# Patient Record
Sex: FEMALE | Race: WHITE | NOT HISPANIC OR LATINO | Employment: PART TIME | URBAN - NONMETROPOLITAN AREA
[De-identification: names, ages, dates, MRNs, and addresses within clinical notes are randomized per-mention and may not be internally consistent; named-entity substitution may affect disease eponyms.]

---
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
## 2019-03-01 ENCOUNTER — OFFICE VISIT (OUTPATIENT)
Dept: OBSTETRICS AND GYNECOLOGY | Facility: CLINIC | Age: 19
End: 2019-03-01

## 2019-03-01 VITALS
HEIGHT: 61 IN | BODY MASS INDEX: 20.01 KG/M2 | DIASTOLIC BLOOD PRESSURE: 60 MMHG | WEIGHT: 106 LBS | SYSTOLIC BLOOD PRESSURE: 90 MMHG

## 2019-03-01 DIAGNOSIS — Z71.85 HPV VACCINE COUNSELING: ICD-10-CM

## 2019-03-01 DIAGNOSIS — Z00.00 WELL WOMAN EXAM WITHOUT GYNECOLOGICAL EXAM: Primary | ICD-10-CM

## 2019-03-01 DIAGNOSIS — N92.0 MENORRHAGIA WITH REGULAR CYCLE: ICD-10-CM

## 2019-03-01 PROCEDURE — 99395 PREV VISIT EST AGE 18-39: CPT | Performed by: NURSE PRACTITIONER

## 2019-03-01 RX ORDER — NORETHINDRONE ACETATE AND ETHINYL ESTRADIOL, AND FERROUS FUMARATE 1MG-20(24)
1 KIT ORAL DAILY
Qty: 84 CAPSULE | Refills: 3 | Status: SHIPPED | OUTPATIENT
Start: 2019-03-01 | End: 2020-05-29 | Stop reason: SDUPTHER

## 2019-03-01 NOTE — PROGRESS NOTES
Subjective   Brittney Stevens is a 18 y.o. female  YOB: 2000        Chief Complaint   Patient presents with   • Contraception     Pt is here for refill on birth control pills.       Contraception   The current episode started more than 1 year ago. Pertinent negatives include no abdominal pain, arthralgias, chest pain, congestion, fatigue, fever, joint swelling, myalgias, nausea, numbness, rash, sore throat or vomiting.       The following portions of the patient's history were reviewed and updated as appropriate: allergies, current medications, past family history, past medical history, past social history, past surgical history and problem list.    No Known Allergies    History reviewed. No pertinent past medical history.    Family History   Problem Relation Age of Onset   • Lung cancer Maternal Grandfather    • Breast cancer Neg Hx    • Ovarian cancer Neg Hx    • Colon cancer Neg Hx        Social History     Socioeconomic History   • Marital status: Single     Spouse name: Not on file   • Number of children: Not on file   • Years of education: Not on file   • Highest education level: Not on file   Social Needs   • Financial resource strain: Not on file   • Food insecurity - worry: Not on file   • Food insecurity - inability: Not on file   • Transportation needs - medical: Not on file   • Transportation needs - non-medical: Not on file   Occupational History   • Not on file   Tobacco Use   • Smoking status: Never Smoker   • Smokeless tobacco: Never Used   Substance and Sexual Activity   • Alcohol use: No   • Drug use: No   • Sexual activity: Not on file   Other Topics Concern   • Not on file   Social History Narrative   • Not on file         Current Outpatient Medications:   •  Norethin Ace-Eth Estrad-FE (TAYTULLA) 1-20 MG-MCG(24) capsule, Take 1 tablet by mouth Daily., Disp: 84 capsule, Rfl: 3    Patient's last menstrual period was 02/21/2019 (exact date).    Sexual History:         Could not be  calculated    History reviewed. No pertinent surgical history.    Review of Systems   Constitutional: Negative for activity change, appetite change, fatigue, fever, unexpected weight gain and unexpected weight loss.   HENT: Negative for congestion, ear pain, hearing loss, nosebleeds, rhinorrhea, sore throat, tinnitus and trouble swallowing.    Eyes: Negative for blurred vision, pain, discharge, itching and visual disturbance.   Respiratory: Negative for apnea, chest tightness, shortness of breath and wheezing.    Cardiovascular: Negative for chest pain and leg swelling.   Gastrointestinal: Negative for abdominal pain, blood in stool, constipation, diarrhea, nausea, vomiting and GERD.   Endocrine: Negative for heat intolerance, polydipsia and polyuria.   Genitourinary: Negative for urinary incontinence, decreased libido, difficulty urinating, dyspareunia, dysuria, frequency, genital sores, hematuria, menstrual problem, pelvic pain, urgency, vaginal pain and breast lump.   Musculoskeletal: Negative for arthralgias, back pain, joint swelling and myalgias.   Skin: Negative for color change, rash and skin lesions.   Allergic/Immunologic: Negative for environmental allergies, food allergies and immunocompromised state.   Neurological: Negative for dizziness, tremors, seizures, syncope, facial asymmetry, numbness and headache.   Hematological: Negative for adenopathy. Does not bruise/bleed easily.   Psychiatric/Behavioral: Negative for agitation, hallucinations, sleep disturbance, suicidal ideas and depressed mood. The patient is not nervous/anxious.        Objective   Physical Exam   Constitutional: She is oriented to person, place, and time. She appears well-developed and well-nourished.   HENT:   Head: Normocephalic.   Eyes: Pupils are equal, round, and reactive to light.   Neck: Normal range of motion.   Cardiovascular: Normal rate and regular rhythm.   Pulmonary/Chest: Effort normal and breath sounds normal.  "  Abdominal: Soft.   Musculoskeletal: Normal range of motion.   Neurological: She is alert and oriented to person, place, and time.   Skin: Skin is warm and dry.   Psychiatric: She has a normal mood and affect. Her behavior is normal.   Nursing note and vitals reviewed.        Vitals:    03/01/19 0954   BP: 90/60   Weight: 48.1 kg (106 lb)   Height: 154.9 cm (61\")       Brittney was seen today for contraception.    Diagnoses and all orders for this visit:    Well woman exam without gynecological exam  Comments:  Patient reports frequent spotting/bleeding throughout the month while on Lo Loestrin FE.  Discussed options and risks/benefits.  Samples and RX of Taytulla give    Breakthrough bleeding  Comments:  Patient reports frequent spotting/bleeding throughout the month while on Lo Loestrin FE.  Discussed options and risks/benefits.  Samples and RX of Taytulla give  Orders:  -     Norethin Ace-Eth Estrad-FE (TAYTULLA) 1-20 MG-MCG(24) capsule; Take 1 tablet by mouth Daily.    Menorrhagia with regular cycle  -     Norethin Ace-Eth Estrad-FE (TAYTULLA) 1-20 MG-MCG(24) capsule; Take 1 tablet by mouth Daily.    HPV vaccine counseling          Patient's Body mass index is 20.03 kg/m². BMI is below normal parameters. Recommendations include: none (medical contraindication).             Non-Smoker    MyChart Instructions Given       "

## 2020-05-29 ENCOUNTER — TELEPHONE (OUTPATIENT)
Dept: OBSTETRICS AND GYNECOLOGY | Facility: CLINIC | Age: 20
End: 2020-05-29

## 2020-05-29 DIAGNOSIS — N92.0 MENORRHAGIA WITH REGULAR CYCLE: ICD-10-CM

## 2020-05-29 RX ORDER — NORETHINDRONE ACETATE AND ETHINYL ESTRADIOL, AND FERROUS FUMARATE 1MG-20(24)
1 KIT ORAL DAILY
Qty: 28 CAPSULE | Refills: 0 | Status: SHIPPED | OUTPATIENT
Start: 2020-05-29 | End: 2020-06-09 | Stop reason: SDUPTHER

## 2020-05-29 NOTE — TELEPHONE ENCOUNTER
Pt calls - she has scheduled her appt on 6/9/2020.  Requesting one refill on OCP to get to her appt.   Med order sent.

## 2020-06-09 ENCOUNTER — OFFICE VISIT (OUTPATIENT)
Dept: OBSTETRICS AND GYNECOLOGY | Facility: CLINIC | Age: 20
End: 2020-06-09

## 2020-06-09 VITALS
DIASTOLIC BLOOD PRESSURE: 68 MMHG | SYSTOLIC BLOOD PRESSURE: 118 MMHG | HEIGHT: 61 IN | BODY MASS INDEX: 21.52 KG/M2 | WEIGHT: 114 LBS

## 2020-06-09 DIAGNOSIS — Z00.00 WELL WOMAN EXAM WITHOUT GYNECOLOGICAL EXAM: Primary | ICD-10-CM

## 2020-06-09 DIAGNOSIS — Z71.85 HPV VACCINE COUNSELING: ICD-10-CM

## 2020-06-09 DIAGNOSIS — N92.0 MENORRHAGIA WITH REGULAR CYCLE: ICD-10-CM

## 2020-06-09 PROCEDURE — 90651 9VHPV VACCINE 2/3 DOSE IM: CPT | Performed by: NURSE PRACTITIONER

## 2020-06-09 PROCEDURE — 99395 PREV VISIT EST AGE 18-39: CPT | Performed by: NURSE PRACTITIONER

## 2020-06-09 PROCEDURE — 90471 IMMUNIZATION ADMIN: CPT | Performed by: NURSE PRACTITIONER

## 2020-06-09 RX ORDER — NORETHINDRONE ACETATE AND ETHINYL ESTRADIOL, AND FERROUS FUMARATE 1MG-20(24)
1 KIT ORAL DAILY
Qty: 84 CAPSULE | Refills: 3 | Status: SHIPPED | OUTPATIENT
Start: 2020-06-09 | End: 2021-04-22 | Stop reason: SDUPTHER

## 2020-06-09 NOTE — PROGRESS NOTES
Subjective   Brittney Stevens is a 19 y.o. female  YOB: 2000        Chief Complaint   Patient presents with   • Annual Exam     Patient here for yearly exam. Last yearly was on 03/01/19. Patient is on Taytulla OCP and doing well with it. Patient needs refills sent to her pharmacy. Patient voices no complaints or concerns today.        Gynecologic Exam   The patient's pertinent negatives include no pelvic pain. Pertinent negatives include no abdominal pain, back pain, constipation, diarrhea, dysuria, fever, frequency, hematuria, nausea, rash, sore throat, urgency or vomiting.       The following portions of the patient's history were reviewed and updated as appropriate: allergies, current medications, past family history, past medical history, past social history, past surgical history and problem list.    No Known Allergies    History reviewed. No pertinent past medical history.    Family History   Problem Relation Age of Onset   • Lung cancer Maternal Grandfather    • Breast cancer Neg Hx    • Ovarian cancer Neg Hx    • Colon cancer Neg Hx        Social History     Socioeconomic History   • Marital status: Single     Spouse name: Not on file   • Number of children: Not on file   • Years of education: Not on file   • Highest education level: Not on file   Tobacco Use   • Smoking status: Never Smoker   • Smokeless tobacco: Never Used   Substance and Sexual Activity   • Alcohol use: No   • Drug use: No         Current Outpatient Medications:   •  Norethin Ace-Eth Estrad-FE (Taytulla) 1-20 MG-MCG(24) capsule, Take 1 tablet by mouth Daily., Disp: 84 capsule, Rfl: 3    No LMP recorded.    Sexual History:         Could not be calculated    No past surgical history on file.    Review of Systems   Constitutional: Negative for activity change, appetite change, fatigue, fever, unexpected weight gain and unexpected weight loss.   HENT: Negative for congestion, ear pain, hearing loss, nosebleeds, rhinorrhea,  sore throat, tinnitus and trouble swallowing.    Eyes: Negative for blurred vision, pain, discharge, itching and visual disturbance.   Respiratory: Negative for apnea, chest tightness, shortness of breath and wheezing.    Cardiovascular: Negative for chest pain and leg swelling.   Gastrointestinal: Negative for abdominal pain, blood in stool, constipation, diarrhea, nausea, vomiting and GERD.   Endocrine: Negative for heat intolerance, polydipsia and polyuria.   Genitourinary: Negative for breast lump, decreased libido, difficulty urinating, dyspareunia, dysuria, frequency, genital sores, hematuria, menstrual problem, pelvic pain, urgency, urinary incontinence and vaginal pain.   Musculoskeletal: Negative for arthralgias, back pain, joint swelling and myalgias.   Skin: Negative for color change, rash and skin lesions.   Allergic/Immunologic: Negative for environmental allergies, food allergies and immunocompromised state.   Neurological: Negative for dizziness, tremors, seizures, syncope, facial asymmetry, numbness and headache.   Hematological: Negative for adenopathy. Does not bruise/bleed easily.   Psychiatric/Behavioral: Negative for agitation, hallucinations, sleep disturbance, suicidal ideas and depressed mood. The patient is not nervous/anxious.        Objective   Physical Exam   Constitutional: She is oriented to person, place, and time. She appears well-developed and well-nourished.   HENT:   Head: Normocephalic.   Eyes: Pupils are equal, round, and reactive to light.   Neck: Normal range of motion.   Cardiovascular: Normal rate and regular rhythm.   Pulmonary/Chest: Effort normal and breath sounds normal.   Abdominal: Soft.   Musculoskeletal: Normal range of motion.   Neurological: She is alert and oriented to person, place, and time.   Skin: Skin is warm and dry.   Psychiatric: She has a normal mood and affect. Her behavior is normal.   Nursing note and vitals reviewed.        Vitals:    06/09/20 1412  "  BP: 118/68   Weight: 51.7 kg (114 lb)   Height: 154.9 cm (61\")       Brittney was seen today for annual exam.    Diagnoses and all orders for this visit:    Well woman exam without gynecological exam  Comments:  Normal well woman exam.    Menorrhagia with regular cycle  Comments:  Doing well on Taytulla that she started related to menorrhagia and wants to remain on it.  Refill sent to pharmacy.  Orders:  -     Norethin Ace-Eth Estrad-FE (Taytulla) 1-20 MG-MCG(24) capsule; Take 1 tablet by mouth Daily.    HPV vaccine counseling  Comments:  Discussed HPV and Gardasil at length with patient patient wants to start Gardasil vaccine series.  First Gardasil injection given today.  Will RTO in 2 months for second          Patient's Body mass index is 21.54 kg/m². BMI is within normal parameters. No follow-up required..             Non-Smoker    MyChart Instructions Given       "

## 2020-08-12 ENCOUNTER — CLINICAL SUPPORT (OUTPATIENT)
Dept: OBSTETRICS AND GYNECOLOGY | Facility: CLINIC | Age: 20
End: 2020-08-12

## 2020-08-12 DIAGNOSIS — Z23 NEED FOR HPV VACCINATION: Primary | ICD-10-CM

## 2020-08-12 PROCEDURE — 90651 9VHPV VACCINE 2/3 DOSE IM: CPT | Performed by: NURSE PRACTITIONER

## 2020-08-12 PROCEDURE — 90471 IMMUNIZATION ADMIN: CPT | Performed by: NURSE PRACTITIONER

## 2020-12-14 ENCOUNTER — TELEPHONE (OUTPATIENT)
Dept: OBSTETRICS AND GYNECOLOGY | Facility: CLINIC | Age: 20
End: 2020-12-14

## 2020-12-14 ENCOUNTER — CLINICAL SUPPORT (OUTPATIENT)
Dept: OBSTETRICS AND GYNECOLOGY | Facility: CLINIC | Age: 20
End: 2020-12-14

## 2020-12-14 DIAGNOSIS — Z23 NEED FOR HPV VACCINATION: Primary | ICD-10-CM

## 2020-12-14 PROCEDURE — 90651 9VHPV VACCINE 2/3 DOSE IM: CPT | Performed by: NURSE PRACTITIONER

## 2020-12-14 PROCEDURE — 96372 THER/PROPH/DIAG INJ SC/IM: CPT | Performed by: NURSE PRACTITIONER

## 2021-04-22 ENCOUNTER — TELEPHONE (OUTPATIENT)
Dept: OBSTETRICS AND GYNECOLOGY | Facility: CLINIC | Age: 21
End: 2021-04-22

## 2021-04-22 DIAGNOSIS — N92.0 MENORRHAGIA WITH REGULAR CYCLE: Primary | ICD-10-CM

## 2021-04-22 RX ORDER — NORETHINDRONE ACETATE AND ETHINYL ESTRADIOL, AND FERROUS FUMARATE 1MG-20(24)
1 KIT ORAL DAILY
Qty: 28 CAPSULE | Refills: 0 | Status: SHIPPED | OUTPATIENT
Start: 2021-04-22 | End: 2021-07-06

## 2021-04-22 NOTE — TELEPHONE ENCOUNTER
Annual has been scheduled. Patient is requesting refill on Taytulla to be sent to [facility] in [place]

## 2021-07-04 DIAGNOSIS — N92.0 MENORRHAGIA WITH REGULAR CYCLE: ICD-10-CM

## 2021-07-06 RX ORDER — NORETHINDRONE ACETATE AND ETHINYL ESTRADIOL, AND FERROUS FUMARATE 1MG-20(24)
KIT ORAL
Qty: 28 CAPSULE | Refills: 0 | Status: SHIPPED | OUTPATIENT
Start: 2021-07-06 | End: 2021-08-06

## 2021-08-06 DIAGNOSIS — N92.0 MENORRHAGIA WITH REGULAR CYCLE: ICD-10-CM

## 2021-08-06 RX ORDER — NORETHINDRONE ACETATE AND ETHINYL ESTRADIOL, AND FERROUS FUMARATE 1MG-20(24)
KIT ORAL
Qty: 28 CAPSULE | Refills: 0 | Status: SHIPPED | OUTPATIENT
Start: 2021-08-06 | End: 2021-09-08

## 2021-08-26 DIAGNOSIS — N92.0 MENORRHAGIA WITH REGULAR CYCLE: ICD-10-CM

## 2021-08-30 ENCOUNTER — TELEPHONE (OUTPATIENT)
Dept: OBSTETRICS AND GYNECOLOGY | Facility: CLINIC | Age: 21
End: 2021-08-30

## 2021-08-30 DIAGNOSIS — N92.0 MENORRHAGIA WITH REGULAR CYCLE: ICD-10-CM

## 2021-08-30 RX ORDER — NORETHINDRONE ACETATE AND ETHINYL ESTRADIOL, AND FERROUS FUMARATE 1MG-20(24)
KIT ORAL
Qty: 28 CAPSULE | Refills: 0 | OUTPATIENT
Start: 2021-08-30

## 2021-08-30 RX ORDER — NORETHINDRONE ACETATE AND ETHINYL ESTRADIOL, AND FERROUS FUMARATE 1MG-20(24)
1 KIT ORAL DAILY
Qty: 28 CAPSULE | Refills: 5 | Status: CANCELLED | OUTPATIENT
Start: 2021-08-30

## 2021-09-02 DIAGNOSIS — N92.0 MENORRHAGIA WITH REGULAR CYCLE: ICD-10-CM

## 2021-09-02 RX ORDER — NORETHINDRONE ACETATE AND ETHINYL ESTRADIOL, AND FERROUS FUMARATE 1MG-20(24)
KIT ORAL
Qty: 28 CAPSULE | Refills: 0 | OUTPATIENT
Start: 2021-09-02

## 2021-09-05 DIAGNOSIS — N92.0 MENORRHAGIA WITH REGULAR CYCLE: ICD-10-CM

## 2021-09-08 RX ORDER — NORETHINDRONE ACETATE AND ETHINYL ESTRADIOL, AND FERROUS FUMARATE 1MG-20(24)
KIT ORAL
Qty: 28 CAPSULE | Refills: 2 | Status: SHIPPED | OUTPATIENT
Start: 2021-09-08

## 2022-06-25 ENCOUNTER — HOSPITAL ENCOUNTER (EMERGENCY)
Facility: HOSPITAL | Age: 22
Discharge: HOME | End: 2022-06-25
Attending: EMERGENCY MEDICINE | Admitting: EMERGENCY MEDICINE

## 2022-06-25 ENCOUNTER — APPOINTMENT (OUTPATIENT)
Dept: CT IMAGING | Facility: HOSPITAL | Age: 22
End: 2022-06-25

## 2022-06-25 VITALS
WEIGHT: 122 LBS | RESPIRATION RATE: 18 BRPM | HEART RATE: 94 BPM | BODY MASS INDEX: 21.62 KG/M2 | SYSTOLIC BLOOD PRESSURE: 102 MMHG | DIASTOLIC BLOOD PRESSURE: 69 MMHG | OXYGEN SATURATION: 99 % | TEMPERATURE: 98.9 F | HEIGHT: 63 IN

## 2022-06-25 DIAGNOSIS — S00.93XA CONTUSION OF HEAD, UNSPECIFIED PART OF HEAD, INITIAL ENCOUNTER: Primary | ICD-10-CM

## 2022-06-25 DIAGNOSIS — J02.9 PHARYNGITIS, UNSPECIFIED ETIOLOGY: ICD-10-CM

## 2022-06-25 LAB — S PYO AG THROAT QL: NEGATIVE

## 2022-06-25 PROCEDURE — 87081 CULTURE SCREEN ONLY: CPT | Performed by: EMERGENCY MEDICINE

## 2022-06-25 PROCEDURE — 99283 EMERGENCY DEPT VISIT LOW MDM: CPT

## 2022-06-25 PROCEDURE — 87880 STREP A ASSAY W/OPTIC: CPT | Performed by: EMERGENCY MEDICINE

## 2022-06-25 PROCEDURE — 70450 CT HEAD/BRAIN W/O DYE: CPT

## 2022-06-25 RX ORDER — PREDNISONE 20 MG/1
10 TABLET ORAL 2 TIMES DAILY
Qty: 10 TABLET | Refills: 0 | Status: SHIPPED | OUTPATIENT
Start: 2022-06-25 | End: 2022-07-03 | Stop reason: HOSPADM

## 2022-06-25 NOTE — ED PROVIDER NOTES
"Subjective   Patient's initial complaint mostly of sore throat.  She has been going on for the past 4 to 5 days.  She denies any fever or chills but does say she has had some nasal congestion but no cough.  She does complain of a headache but she says that started on the day before the sore throat because a \"tree fell on the he had\".  She says that the top of her head.  She has had headaches since then that radiates all over throughout her head.      History provided by:  Patient   used: No    Sore Throat  Location:  Generalized  Quality:  Aching  Severity:  Moderate  Onset quality:  Gradual  Duration:  4 days  Timing:  Constant  Progression:  Unchanged  Chronicity:  New  Relieved by:  Nothing  Worsened by:  Nothing  Ineffective treatments:  None tried  Associated symptoms: headaches, rhinorrhea and sinus congestion    Associated symptoms: no abdominal pain, no adenopathy, no chest pain, no chills, no cough, no drooling, no ear discharge, no ear pain, no epistaxis, no eye discharge, no fever, no neck stiffness, no night sweats, no plugged ear sensation, no postnasal drip, no rash, no shortness of breath, no stridor, no trouble swallowing and no voice change    Risk factors: no exposure to strep, no exposure to mono, no sick contacts, no recent dental procedure, no recent endoscopy and no recent ENT procedure        Review of Systems   Constitutional: Negative.  Negative for chills, fever and night sweats.   HENT: Positive for rhinorrhea and sore throat. Negative for drooling, ear discharge, ear pain, nosebleeds, postnasal drip, trouble swallowing and voice change.    Eyes: Negative for discharge.   Respiratory: Negative.  Negative for cough, shortness of breath and stridor.    Cardiovascular: Negative.  Negative for chest pain.   Gastrointestinal: Negative.  Negative for abdominal pain.   Genitourinary: Negative.    Musculoskeletal: Negative.  Negative for neck stiffness.   Skin: Negative.  " Negative for rash.   Neurological: Positive for headaches.   Hematological: Negative for adenopathy.   Psychiatric/Behavioral: Negative.    All other systems reviewed and are negative.      No past medical history on file.    No Known Allergies    No past surgical history on file.    Family History   Problem Relation Age of Onset   • Lung cancer Maternal Grandfather    • Breast cancer Neg Hx    • Ovarian cancer Neg Hx    • Colon cancer Neg Hx        Social History     Socioeconomic History   • Marital status: Single   Tobacco Use   • Smoking status: Never Smoker   • Smokeless tobacco: Never Used   Substance and Sexual Activity   • Alcohol use: No   • Drug use: No       Prior to Admission medications    Medication Sig Start Date End Date Taking? Authorizing Provider   Dustin 1-20 MG-MCG(24) capsule TAKE 1 CAPSULE BY MOUTH DAILY 9/8/21   Ijeoma Ross APRN       Medications - No data to display    Vitals:    06/25/22 0859   BP: 102/69   Pulse: 94   Resp: 18   Temp: 98.9 °F (37.2 °C)   SpO2: 99%         Objective   Physical Exam  Vitals and nursing note reviewed.   Constitutional:       Appearance: She is well-developed.   HENT:      Head: Normocephalic and atraumatic.      Mouth/Throat:      Mouth: Mucous membranes are moist.      Pharynx: Oropharynx is clear. Posterior oropharyngeal erythema present.   Eyes:      Conjunctiva/sclera: Conjunctivae normal.      Pupils: Pupils are equal, round, and reactive to light.   Cardiovascular:      Rate and Rhythm: Normal rate and regular rhythm.   Pulmonary:      Effort: Pulmonary effort is normal.      Breath sounds: Normal breath sounds.   Musculoskeletal:      Cervical back: Normal range of motion and neck supple.   Neurological:      General: No focal deficit present.      Mental Status: She is alert and oriented to person, place, and time.   Psychiatric:         Mood and Affect: Mood normal.         Behavior: Behavior normal.         Procedures         Lab Results  (last 24 hours)     Procedure Component Value Units Date/Time    Rapid Strep A Screen - Swab, Throat [[id number]]  (Normal) Collected: 06/25/22 0758    Specimen: Swab from Throat Updated: 06/25/22 0816     Strep A Ag Negative    Beta Strep Culture, Throat - Swab, Throat [[id number]] Collected: 06/25/22 0758    Specimen: Swab from Throat Updated: 06/25/22 0816          CT Head Without Contrast   Final Result   1. No acute intracranial process.           This report was finalized on 06/25/2022 08:23 by Dr. Clarence Riley MD.          ED Course  ED Course as of 06/25/22 1150   Sat Jun 25, 2022   0847 I told the patient her CT was negative and her strep screen was negative also.  She apparently just has a headache from the contusion but that would get better with time.  Her sore throat apparently is viral but we can treat her symptomatically.  She is discharged in stable condition. [TR]      ED Course User Index  [TR] Floyd Pastrana Jr., MD          MDM  Number of Diagnoses or Management Options  Contusion of head, unspecified part of head, initial encounter: new and requires workup  Pharyngitis, unspecified etiology: new and requires workup     Amount and/or Complexity of Data Reviewed  Clinical lab tests: reviewed and ordered  Tests in the radiology section of CPT®: ordered and reviewed    Risk of Complications, Morbidity, and/or Mortality  Presenting problems: moderate  Diagnostic procedures: moderate  Management options: moderate    Patient Progress  Patient progress: stable      Final diagnoses:   Contusion of head, unspecified part of head, initial encounter   Pharyngitis, unspecified etiology          Floyd Pastrana Jr., MD  06/25/22 1150

## 2022-06-27 LAB — BACTERIA SPEC AEROBE CULT: NORMAL

## 2022-07-01 ENCOUNTER — HOSPITAL ENCOUNTER (OUTPATIENT)
Facility: HOSPITAL | Age: 22
Discharge: HOME | End: 2022-07-03
Attending: OTOLARYNGOLOGY | Admitting: OTOLARYNGOLOGY

## 2022-07-01 DIAGNOSIS — Z90.89 S/P TONSILLECTOMY: ICD-10-CM

## 2022-07-01 DIAGNOSIS — J03.90 ACUTE TONSILLITIS, UNSPECIFIED ETIOLOGY: Primary | ICD-10-CM

## 2022-07-01 DIAGNOSIS — J36 PERITONSILLAR ABSCESS: ICD-10-CM

## 2022-07-01 DIAGNOSIS — B27.00 GAMMAHERPESVIRAL MONONUCLEOSIS WITHOUT COMPLICATION: ICD-10-CM

## 2022-07-01 PROBLEM — J39.2 PHARYNGEAL PAIN: Status: ACTIVE | Noted: 2022-07-01

## 2022-07-01 LAB
ALBUMIN SERPL-MCNC: 3.3 G/DL (ref 3.5–5.2)
ALBUMIN/GLOB SERPL: 1 G/DL
ALP SERPL-CCNC: 125 U/L (ref 39–117)
ALT SERPL W P-5'-P-CCNC: 69 U/L (ref 1–33)
ANION GAP SERPL CALCULATED.3IONS-SCNC: 9 MMOL/L (ref 5–15)
AST SERPL-CCNC: 13 U/L (ref 1–32)
B-HCG UR QL: NEGATIVE
BASOPHILS # BLD AUTO: 0.04 10*3/MM3 (ref 0–0.2)
BASOPHILS NFR BLD AUTO: 0.2 % (ref 0–1.5)
BILIRUB SERPL-MCNC: 0.2 MG/DL (ref 0–1.2)
BUN SERPL-MCNC: 11 MG/DL (ref 6–20)
BUN/CREAT SERPL: 22 (ref 7–25)
CALCIUM SPEC-SCNC: 8.6 MG/DL (ref 8.6–10.5)
CHLORIDE SERPL-SCNC: 99 MMOL/L (ref 98–107)
CO2 SERPL-SCNC: 30 MMOL/L (ref 22–29)
CREAT SERPL-MCNC: 0.5 MG/DL (ref 0.57–1)
DEPRECATED RDW RBC AUTO: 45.1 FL (ref 37–54)
EGFRCR SERPLBLD CKD-EPI 2021: 136.2 ML/MIN/1.73
EOSINOPHIL # BLD AUTO: 0 10*3/MM3 (ref 0–0.4)
EOSINOPHIL NFR BLD AUTO: 0 % (ref 0.3–6.2)
ERYTHROCYTE [DISTWIDTH] IN BLOOD BY AUTOMATED COUNT: 13.3 % (ref 12.3–15.4)
GLOBULIN UR ELPH-MCNC: 3.3 GM/DL
GLUCOSE SERPL-MCNC: 86 MG/DL (ref 65–99)
HCT VFR BLD AUTO: 42.7 % (ref 34–46.6)
HETEROPH AB SER QL LA: POSITIVE
HGB BLD-MCNC: 13.8 G/DL (ref 12–15.9)
IMM GRANULOCYTES # BLD AUTO: 0.34 10*3/MM3 (ref 0–0.05)
IMM GRANULOCYTES NFR BLD AUTO: 2.1 % (ref 0–0.5)
LYMPHOCYTES # BLD AUTO: 1.87 10*3/MM3 (ref 0.7–3.1)
LYMPHOCYTES NFR BLD AUTO: 11.6 % (ref 19.6–45.3)
MCH RBC QN AUTO: 29.4 PG (ref 26.6–33)
MCHC RBC AUTO-ENTMCNC: 32.3 G/DL (ref 31.5–35.7)
MCV RBC AUTO: 90.9 FL (ref 79–97)
MONOCYTES # BLD AUTO: 1.37 10*3/MM3 (ref 0.1–0.9)
MONOCYTES NFR BLD AUTO: 8.5 % (ref 5–12)
NEUTROPHILS NFR BLD AUTO: 12.56 10*3/MM3 (ref 1.7–7)
NEUTROPHILS NFR BLD AUTO: 77.6 % (ref 42.7–76)
NRBC BLD AUTO-RTO: 0.1 /100 WBC (ref 0–0.2)
PLATELET # BLD AUTO: 398 10*3/MM3 (ref 140–450)
PMV BLD AUTO: 9.6 FL (ref 6–12)
POTASSIUM SERPL-SCNC: 4.5 MMOL/L (ref 3.5–5.2)
PROT SERPL-MCNC: 6.6 G/DL (ref 6–8.5)
RBC # BLD AUTO: 4.7 10*6/MM3 (ref 3.77–5.28)
SARS-COV-2 RNA PNL SPEC NAA+PROBE: NOT DETECTED
SODIUM SERPL-SCNC: 138 MMOL/L (ref 136–145)
WBC NRBC COR # BLD: 16.18 10*3/MM3 (ref 3.4–10.8)

## 2022-07-01 PROCEDURE — 96375 TX/PRO/DX INJ NEW DRUG ADDON: CPT

## 2022-07-01 PROCEDURE — 80053 COMPREHEN METABOLIC PANEL: CPT | Performed by: OTOLARYNGOLOGY

## 2022-07-01 PROCEDURE — 96374 THER/PROPH/DIAG INJ IV PUSH: CPT

## 2022-07-01 PROCEDURE — 87635 SARS-COV-2 COVID-19 AMP PRB: CPT | Performed by: OTOLARYNGOLOGY

## 2022-07-01 PROCEDURE — 25010000002 DEXAMETHASONE PER 1 MG: Performed by: OTOLARYNGOLOGY

## 2022-07-01 PROCEDURE — 96361 HYDRATE IV INFUSION ADD-ON: CPT

## 2022-07-01 PROCEDURE — 85025 COMPLETE CBC W/AUTO DIFF WBC: CPT | Performed by: OTOLARYNGOLOGY

## 2022-07-01 PROCEDURE — C9803 HOPD COVID-19 SPEC COLLECT: HCPCS

## 2022-07-01 PROCEDURE — 25010000002 KETOROLAC TROMETHAMINE PER 15 MG: Performed by: OTOLARYNGOLOGY

## 2022-07-01 PROCEDURE — 99219 PR INITIAL OBSERVATION CARE/DAY 50 MINUTES: CPT | Performed by: OTOLARYNGOLOGY

## 2022-07-01 PROCEDURE — 81025 URINE PREGNANCY TEST: CPT | Performed by: OTOLARYNGOLOGY

## 2022-07-01 PROCEDURE — 86308 HETEROPHILE ANTIBODY SCREEN: CPT | Performed by: OTOLARYNGOLOGY

## 2022-07-01 RX ORDER — BISACODYL 10 MG
10 SUPPOSITORY, RECTAL RECTAL DAILY PRN
Status: DISCONTINUED | OUTPATIENT
Start: 2022-07-01 | End: 2022-07-03 | Stop reason: HOSPADM

## 2022-07-01 RX ORDER — SODIUM CHLORIDE 0.9 % (FLUSH) 0.9 %
10 SYRINGE (ML) INJECTION AS NEEDED
Status: DISCONTINUED | OUTPATIENT
Start: 2022-07-01 | End: 2022-07-03 | Stop reason: HOSPADM

## 2022-07-01 RX ORDER — AMOXICILLIN 250 MG
2 CAPSULE ORAL 2 TIMES DAILY
Status: DISCONTINUED | OUTPATIENT
Start: 2022-07-01 | End: 2022-07-03 | Stop reason: HOSPADM

## 2022-07-01 RX ORDER — KETOROLAC TROMETHAMINE 30 MG/ML
30 INJECTION, SOLUTION INTRAMUSCULAR; INTRAVENOUS EVERY 8 HOURS PRN
Status: DISCONTINUED | OUTPATIENT
Start: 2022-07-01 | End: 2022-07-03 | Stop reason: HOSPADM

## 2022-07-01 RX ORDER — ACETAMINOPHEN 650 MG/1
650 SUPPOSITORY RECTAL EVERY 4 HOURS PRN
Status: DISCONTINUED | OUTPATIENT
Start: 2022-07-01 | End: 2022-07-03 | Stop reason: HOSPADM

## 2022-07-01 RX ORDER — OXYMETAZOLINE HYDROCHLORIDE 0.05 G/100ML
2 SPRAY NASAL 3 TIMES DAILY
Status: DISCONTINUED | OUTPATIENT
Start: 2022-07-01 | End: 2022-07-03 | Stop reason: HOSPADM

## 2022-07-01 RX ORDER — SODIUM CHLORIDE 450 MG/100ML
100 INJECTION, SOLUTION INTRAVENOUS CONTINUOUS
Status: DISCONTINUED | OUTPATIENT
Start: 2022-07-01 | End: 2022-07-03 | Stop reason: HOSPADM

## 2022-07-01 RX ORDER — BISACODYL 5 MG/1
5 TABLET, DELAYED RELEASE ORAL DAILY PRN
Status: DISCONTINUED | OUTPATIENT
Start: 2022-07-01 | End: 2022-07-03 | Stop reason: HOSPADM

## 2022-07-01 RX ORDER — ACETAMINOPHEN 160 MG/5ML
650 SOLUTION ORAL EVERY 4 HOURS PRN
Status: DISCONTINUED | OUTPATIENT
Start: 2022-07-01 | End: 2022-07-03 | Stop reason: HOSPADM

## 2022-07-01 RX ORDER — ECHINACEA PURPUREA EXTRACT 125 MG
2 TABLET ORAL
Status: DISCONTINUED | OUTPATIENT
Start: 2022-07-01 | End: 2022-07-03 | Stop reason: HOSPADM

## 2022-07-01 RX ORDER — HYDROCODONE BITARTRATE AND ACETAMINOPHEN 5; 325 MG/1; MG/1
1 TABLET ORAL EVERY 4 HOURS PRN
Status: DISCONTINUED | OUTPATIENT
Start: 2022-07-01 | End: 2022-07-03 | Stop reason: HOSPADM

## 2022-07-01 RX ORDER — SODIUM CHLORIDE 0.9 % (FLUSH) 0.9 %
10 SYRINGE (ML) INJECTION EVERY 12 HOURS SCHEDULED
Status: DISCONTINUED | OUTPATIENT
Start: 2022-07-01 | End: 2022-07-03 | Stop reason: HOSPADM

## 2022-07-01 RX ORDER — CLINDAMYCIN PHOSPHATE 600 MG/50ML
600 INJECTION INTRAVENOUS EVERY 8 HOURS
Status: DISCONTINUED | OUTPATIENT
Start: 2022-07-01 | End: 2022-07-03 | Stop reason: HOSPADM

## 2022-07-01 RX ORDER — ACETAMINOPHEN 325 MG/1
650 TABLET ORAL EVERY 4 HOURS PRN
Status: DISCONTINUED | OUTPATIENT
Start: 2022-07-01 | End: 2022-07-03 | Stop reason: HOSPADM

## 2022-07-01 RX ORDER — OXYCODONE AND ACETAMINOPHEN 7.5; 325 MG/1; MG/1
2 TABLET ORAL EVERY 4 HOURS PRN
Status: DISCONTINUED | OUTPATIENT
Start: 2022-07-01 | End: 2022-07-03 | Stop reason: HOSPADM

## 2022-07-01 RX ORDER — ECHINACEA PURPUREA EXTRACT 125 MG
2 TABLET ORAL CONTINUOUS PRN
Status: DISCONTINUED | OUTPATIENT
Start: 2022-07-01 | End: 2022-07-03 | Stop reason: HOSPADM

## 2022-07-01 RX ORDER — FAMOTIDINE 20 MG/1
40 TABLET, FILM COATED ORAL DAILY
Status: DISCONTINUED | OUTPATIENT
Start: 2022-07-02 | End: 2022-07-03 | Stop reason: HOSPADM

## 2022-07-01 RX ORDER — ONDANSETRON 2 MG/ML
4 INJECTION INTRAMUSCULAR; INTRAVENOUS EVERY 6 HOURS PRN
Status: DISCONTINUED | OUTPATIENT
Start: 2022-07-01 | End: 2022-07-03 | Stop reason: HOSPADM

## 2022-07-01 RX ORDER — DEXAMETHASONE SODIUM PHOSPHATE 4 MG/ML
8 INJECTION, SOLUTION INTRA-ARTICULAR; INTRALESIONAL; INTRAMUSCULAR; INTRAVENOUS; SOFT TISSUE EVERY 6 HOURS
Status: DISCONTINUED | OUTPATIENT
Start: 2022-07-01 | End: 2022-07-03 | Stop reason: HOSPADM

## 2022-07-01 RX ORDER — MORPHINE SULFATE 2 MG/ML
1 INJECTION, SOLUTION INTRAMUSCULAR; INTRAVENOUS
Status: DISCONTINUED | OUTPATIENT
Start: 2022-07-01 | End: 2022-07-03 | Stop reason: HOSPADM

## 2022-07-01 RX ORDER — POLYETHYLENE GLYCOL 3350 17 G/17G
17 POWDER, FOR SOLUTION ORAL DAILY PRN
Status: DISCONTINUED | OUTPATIENT
Start: 2022-07-01 | End: 2022-07-03 | Stop reason: HOSPADM

## 2022-07-01 RX ADMIN — OXYMETAZOLINE HYDROCHLORIDE 2 SPRAY: 0.05 SPRAY NASAL at 20:33

## 2022-07-01 RX ADMIN — KETOROLAC TROMETHAMINE 30 MG: 30 INJECTION, SOLUTION INTRAMUSCULAR; INTRAVENOUS at 18:07

## 2022-07-01 RX ADMIN — SALINE NASAL SPRAY 2 SPRAY: 1.5 SOLUTION NASAL at 20:33

## 2022-07-01 RX ADMIN — DOCUSATE SODIUM 50 MG AND SENNOSIDES 8.6 MG 2 TABLET: 8.6; 5 TABLET, FILM COATED ORAL at 20:33

## 2022-07-01 RX ADMIN — HYDROCODONE BITARTRATE AND ACETAMINOPHEN 1 TABLET: 5; 325 TABLET ORAL at 16:51

## 2022-07-01 RX ADMIN — SODIUM CHLORIDE 100 ML/HR: 4.5 INJECTION, SOLUTION INTRAVENOUS at 16:52

## 2022-07-01 RX ADMIN — Medication 10 ML: at 20:35

## 2022-07-01 RX ADMIN — DEXAMETHASONE SODIUM PHOSPHATE 8 MG: 4 INJECTION, SOLUTION INTRA-ARTICULAR; INTRALESIONAL; INTRAMUSCULAR; INTRAVENOUS; SOFT TISSUE at 17:08

## 2022-07-01 RX ADMIN — CLINDAMYCIN IN 5 PERCENT DEXTROSE 600 MG: 12 INJECTION, SOLUTION INTRAVENOUS at 16:52

## 2022-07-01 RX ADMIN — BISACODYL 5 MG: 5 TABLET, COATED ORAL at 16:51

## 2022-07-01 RX ADMIN — SALINE NASAL SPRAY 2 SPRAY: 1.5 SOLUTION NASAL at 17:08

## 2022-07-01 NOTE — H&P
[facility] Otolaryngology Head and Neck Surgery  HISTORY AND PHYSICAL  Patient Name: Brittney Stevens  : 2000  MRN: [id number]  Primary Care Physician:  Sam Smith MD  Referring Physician: Mark Ray MD  Date of admission: 2022  Length of Stay: 1  Room: [facility]      Subjective    Subjective   History of Present Illness  Chief Complaint: Left-sided throat pain  Accompanied by: Father, nursing staff  Brittney Stevens is a 21 y.o. female who developed acute pharyngitis approximately 4 days ago.  She presented to [facility] emergency room and was sent home on steroids.  She presented to the [facility] emergency room on Monday.  She was admitted there for pain control.  She was subsequently discharged.  She was readmitted on Wednesday for pain control.  She continued with increasing pain.  She developed left-sided significant pain.  CT scan was obtained.  This revealed a large left peritonsillar abscess.  She continued with her diagnosis of mononucleosis as well.  I was called from the [facility] hospitalist to request transfer for evaluation and treatment of left peritonsillar abscess.  The patient has no history of tonsil infection.  She is complaining of serious left-sided pain with pain radiating up into her ear and down into her neck.  She denies any difficulty breathing.  Patient has no past medical history to note.  Patient is seen, chart is reviewed    Review of Systems   Otherwise complete ROS is negative except as mentioned above.  14 point review was carried out  Personal History     History Review Reviewed Comments   Past Medical History:  [x]     Past Surgical History: [x]     Family History: [x]     Social History: [x]       Past Medical History: No past medical history on file.  None pertinent  Past Surgical History:No past surgical history on file.  None    Family History: Her family history includes Lung cancer in her maternal grandfather.   Social History: She  reports  that she has never smoked. She has never used smokeless tobacco. She reports that she does not drink alcohol and does not use drugs.    Home Medications:  Norethin Ace-Eth Estrad-FE and predniSONE    Allergies:  She has No Known Allergies.    Objective    Objective   Vitals:    Temp:  [98.4 °F (36.9 °C)] 98.4 °F (36.9 °C)  Heart Rate:  [65] 65  Resp:  [14] 14  BP: (110)/(60) 110/60    Physical Exam  Vitals reviewed.   Constitutional:       General: She is in acute distress.      Appearance: Normal appearance. She is well-developed. She is not ill-appearing.      Comments: Lying flat in bed, no airway distress, no voice changes, extremely anxious, appears in pain   HENT:      Head: Normocephalic and atraumatic.      Right Ear: Hearing and external ear normal.      Left Ear: Hearing and external ear normal.      Nose: Nose normal.      Mouth/Throat:      Lips: Pink.      Mouth: Mucous membranes are moist.      Dentition: Normal dentition. No gum lesions.      Tongue: No lesions. Tongue does not deviate from midline.      Pharynx: Uvula swelling present.      Tonsils: Tonsillar exudate and tonsillar abscess present. 3+ on the left.      Comments: Uvula deviated to the right, with left palate lower than right, mild pointing  Eyes:      Extraocular Movements: Extraocular movements intact.      Conjunctiva/sclera: Conjunctivae normal.   Pulmonary:      Effort: Pulmonary effort is normal. No respiratory distress.      Breath sounds: No stridor.   Musculoskeletal:         General: Normal range of motion.      Cervical back: Normal range of motion.   Skin:     Findings: No rash.   Neurological:      General: No focal deficit present.      Mental Status: She is alert.      Cranial Nerves: No cranial nerve deficit.   Psychiatric:         Behavior: Behavior normal. Behavior is cooperative.         Thought Content: Thought content normal.         Judgment: Judgment normal.         Result Review    Result Review:  I have  personally reviewed the results from the time of this admission to 7/1/2022 16:29 CDT and agree with these findings:  []  Laboratory  []  Microbiology  [x]  Radiology  []  EKG/Telemetry   []  Cardiology/Vascular   []  Pathology  []  Old records  []  Other:  Most notable findings include: CT scan with 4 x 2 cm loculated mass in the left peritonsillar area CT from [facility], [place], operated by [facility] reviewed    Assessment & Plan    Assessment / Plan   Brief Patient Summary:  Brittney Stevens is a 21 y.o. female has been transferred from [facility] for evaluation treatment of left peritonsillar abscess.  This is been worsening over the intervening 4 days.  She is now admitted for evaluation and treatment.  She appears to have significant of abscess to warrant drainage.  I have discussed risk, benefits, alternative treatments, options with the patient and her father.  I will proceed to the OR if anesthesia is in agreement.  I will admit patient observation, begin IV hydration, IV steroids, IV antibiotics, oral and IV pain management.  I will plan to proceed to the operating room when the patient is stabilized, resuscitated, and anesthesia ready.    Active Hospital Problems:  Active Hospital Problems    Diagnosis    • Peritonsillar abscess    • Gammaherpesviral mononucleosis    • Acute tonsillitis    • Pharyngeal pain        Plan:   Left peritonsillar abscess-related to long-term tonsil infection over the previous 5 days.  Mononucleosis-pre-existing prior to admission.  Probably the cause of the abscess  Tonsillitis-from mononucleosis  Severe pharyngeal pain-related to peritonsillar abscess    I discussed the patient's findings and my recommendations with patient, father, nursing staff.  Medical and surgical options were discussed including medical and surgical options. Risks, benefits and alternatives were discussed and questions were answered. After considering the options, the patient decided to proceed with surgery.     -----SURGERY  SCHEDULING:-----  Schedule incision and drainage of left peritonsillar abscess with possible tonsillectomy  ---INFORMED CONSENT DISCUSSION:---  MARGARETTE TONSILLECTOMY: A tonsillectomy with drainage of peritonsillar abscess was recommended. The risks and benefits were explained including but not limited to early and late bleeding, pain, re-accumulation of infection, risks of the general anesthesia, dysphagia and poor PO intake, and voice change/VPI. Alternatives were discussed. The patient/parents understood these risks. Questions were asked appropriately answered. No guarantees were made or implied.      ---PREOPERATIVE WORKUP:---  labs/ workup per anesthesia  CBC  CMP  hCG  Anesthesia preop          DVT prophylaxis:  Mechanical DVT prophylaxis orders are present.    Discharge Plannin day depending on response to incision and drainage    Electronically signed by Rajesh Salinas Jr, MD, 22, 3:39 PM CDT.     13-Oct-2018 11:50

## 2022-07-01 NOTE — SIGNIFICANT NOTE
Pt resting in bed. Health assessment complete. IV attempts unsuccessful x2, IV team called for US IV placement. Pt reports pain. Awaiting orders to be placed by MD. Call bell in reach, safety maintained.        07/01/22 1538   HEENT   HEENT [facility] WDL;X;throat   Throat Signs/Symptoms reddened;sore;swelling;tonsils swollen   Mouth/Teeth WDL   Mouth/Teeth WDL WDL   Cognitive   Cognitive/Neuro/Behavioral WDL WD   Respiratory   Respiratory WD WDL   Oxygen Therapy   Device (Oxygen Therapy) room air   Cardiac   Cardiac WDL WDL   Peripheral Neurovascular   Peripheral Neurovascular WDL WD   Genitourinary   Genitourinary WD WDL   Skin   Skin [facility]   Max Risk Assessment   Sensory Perception 4-->no impairment   Moisture 4-->rarely moist   Activity 4-->walks frequently   Mobility 3-->slightly limited   Nutrition 3-->adequate   Friction and Shear 3-->no apparent problem   Max Score 21   Musculoskeletal   Musculoskeletal [facility]   Functional Screen (every 3 days/change)   Ambulation 0 - independent   Transferring 0 - independent   Toileting 0 - independent   Bathing 0 - independent   Dressing 0 - independent   Eating 0 - independent   Communication 0 - understands/communicates without difficulty   Safety   Safety [facility] High Risk Falls Assessment (If Fall score is >/=13, add the Fall Risk CPG to the care plan)    Fallen in past 6 months 0--> No   Mental Status 0--> no mental status change   Elimination 0--> No elimination issues   Mobility 0--> No mobility issues   Medications 0--> No meds   Nurses' Clinical Judgement 0   Total Fall Risk Score 0   Safety Management   Safety Promotion/Fall Prevention safety round/check completed   Daily Care   Activity Management activity adjusted per tolerance

## 2022-07-02 ENCOUNTER — ANESTHESIA (OUTPATIENT)
Dept: PERIOP | Facility: HOSPITAL | Age: 22
End: 2022-07-02

## 2022-07-02 ENCOUNTER — ANESTHESIA EVENT (OUTPATIENT)
Dept: PERIOP | Facility: HOSPITAL | Age: 22
End: 2022-07-02

## 2022-07-02 PROBLEM — Z90.89 S/P TONSILLECTOMY: Status: ACTIVE | Noted: 2022-07-02

## 2022-07-02 PROCEDURE — 42700 I&D ABSCESS PERITONSILLAR: CPT | Performed by: OTOLARYNGOLOGY

## 2022-07-02 PROCEDURE — 25010000002 MORPHINE SULFATE (PF) 2 MG/ML SOLUTION: Performed by: OTOLARYNGOLOGY

## 2022-07-02 PROCEDURE — 87205 SMEAR GRAM STAIN: CPT | Performed by: OTOLARYNGOLOGY

## 2022-07-02 PROCEDURE — 25010000002 KETOROLAC TROMETHAMINE PER 15 MG: Performed by: OTOLARYNGOLOGY

## 2022-07-02 PROCEDURE — 25010000002 ONDANSETRON PER 1 MG: Performed by: NURSE ANESTHETIST, CERTIFIED REGISTERED

## 2022-07-02 PROCEDURE — 25010000002 MIDAZOLAM PER 1 MG: Performed by: NURSE ANESTHETIST, CERTIFIED REGISTERED

## 2022-07-02 PROCEDURE — 96361 HYDRATE IV INFUSION ADD-ON: CPT

## 2022-07-02 PROCEDURE — 96376 TX/PRO/DX INJ SAME DRUG ADON: CPT

## 2022-07-02 PROCEDURE — 96375 TX/PRO/DX INJ NEW DRUG ADDON: CPT

## 2022-07-02 PROCEDURE — 88304 TISSUE EXAM BY PATHOLOGIST: CPT | Performed by: OTOLARYNGOLOGY

## 2022-07-02 PROCEDURE — 87070 CULTURE OTHR SPECIMN AEROBIC: CPT | Performed by: OTOLARYNGOLOGY

## 2022-07-02 PROCEDURE — 25010000002 FENTANYL CITRATE (PF) 100 MCG/2ML SOLUTION: Performed by: NURSE ANESTHETIST, CERTIFIED REGISTERED

## 2022-07-02 PROCEDURE — 87176 TISSUE HOMOGENIZATION CULTR: CPT | Performed by: OTOLARYNGOLOGY

## 2022-07-02 PROCEDURE — 25010000002 PROPOFOL 10 MG/ML EMULSION: Performed by: NURSE ANESTHETIST, CERTIFIED REGISTERED

## 2022-07-02 PROCEDURE — 42826 REMOVAL OF TONSILS: CPT | Performed by: OTOLARYNGOLOGY

## 2022-07-02 PROCEDURE — 25010000002 DEXAMETHASONE PER 1 MG: Performed by: OTOLARYNGOLOGY

## 2022-07-02 PROCEDURE — 25010000002 DEXAMETHASONE PER 1 MG: Performed by: NURSE ANESTHETIST, CERTIFIED REGISTERED

## 2022-07-02 RX ORDER — DROPERIDOL 2.5 MG/ML
0.62 INJECTION, SOLUTION INTRAMUSCULAR; INTRAVENOUS ONCE AS NEEDED
Status: DISCONTINUED | OUTPATIENT
Start: 2022-07-02 | End: 2022-07-02 | Stop reason: HOSPADM

## 2022-07-02 RX ORDER — MAGNESIUM HYDROXIDE 1200 MG/15ML
LIQUID ORAL AS NEEDED
Status: DISCONTINUED | OUTPATIENT
Start: 2022-07-02 | End: 2022-07-02 | Stop reason: HOSPADM

## 2022-07-02 RX ORDER — IBUPROFEN 600 MG/1
600 TABLET ORAL ONCE AS NEEDED
Status: DISCONTINUED | OUTPATIENT
Start: 2022-07-02 | End: 2022-07-02 | Stop reason: HOSPADM

## 2022-07-02 RX ORDER — LABETALOL HYDROCHLORIDE 5 MG/ML
5 INJECTION, SOLUTION INTRAVENOUS
Status: DISCONTINUED | OUTPATIENT
Start: 2022-07-02 | End: 2022-07-02 | Stop reason: HOSPADM

## 2022-07-02 RX ORDER — HYDROMORPHONE HYDROCHLORIDE 1 MG/ML
0.5 INJECTION, SOLUTION INTRAMUSCULAR; INTRAVENOUS; SUBCUTANEOUS
Status: DISCONTINUED | OUTPATIENT
Start: 2022-07-02 | End: 2022-07-02 | Stop reason: HOSPADM

## 2022-07-02 RX ORDER — ONDANSETRON 2 MG/ML
4 INJECTION INTRAMUSCULAR; INTRAVENOUS
Status: DISCONTINUED | OUTPATIENT
Start: 2022-07-02 | End: 2022-07-02 | Stop reason: HOSPADM

## 2022-07-02 RX ORDER — FENTANYL CITRATE 50 UG/ML
INJECTION, SOLUTION INTRAMUSCULAR; INTRAVENOUS AS NEEDED
Status: DISCONTINUED | OUTPATIENT
Start: 2022-07-02 | End: 2022-07-02 | Stop reason: SURG

## 2022-07-02 RX ORDER — FENTANYL CITRATE 50 UG/ML
25 INJECTION, SOLUTION INTRAMUSCULAR; INTRAVENOUS
Status: DISCONTINUED | OUTPATIENT
Start: 2022-07-02 | End: 2022-07-02 | Stop reason: HOSPADM

## 2022-07-02 RX ORDER — MIDAZOLAM HYDROCHLORIDE 1 MG/ML
INJECTION INTRAMUSCULAR; INTRAVENOUS AS NEEDED
Status: DISCONTINUED | OUTPATIENT
Start: 2022-07-02 | End: 2022-07-02 | Stop reason: SURG

## 2022-07-02 RX ORDER — SODIUM CHLORIDE, SODIUM LACTATE, POTASSIUM CHLORIDE, CALCIUM CHLORIDE 600; 310; 30; 20 MG/100ML; MG/100ML; MG/100ML; MG/100ML
INJECTION, SOLUTION INTRAVENOUS CONTINUOUS PRN
Status: DISCONTINUED | OUTPATIENT
Start: 2022-07-02 | End: 2022-07-02 | Stop reason: SURG

## 2022-07-02 RX ORDER — PROPOFOL 10 MG/ML
VIAL (ML) INTRAVENOUS AS NEEDED
Status: DISCONTINUED | OUTPATIENT
Start: 2022-07-02 | End: 2022-07-02 | Stop reason: SURG

## 2022-07-02 RX ORDER — SUCCINYLCHOLINE/SOD CL,ISO/PF 200MG/10ML
SYRINGE (ML) INTRAVENOUS AS NEEDED
Status: DISCONTINUED | OUTPATIENT
Start: 2022-07-02 | End: 2022-07-02 | Stop reason: SURG

## 2022-07-02 RX ORDER — NALOXONE HCL 0.4 MG/ML
0.04 VIAL (ML) INJECTION AS NEEDED
Status: DISCONTINUED | OUTPATIENT
Start: 2022-07-02 | End: 2022-07-02 | Stop reason: HOSPADM

## 2022-07-02 RX ORDER — OXYCODONE AND ACETAMINOPHEN 10; 325 MG/1; MG/1
1 TABLET ORAL ONCE AS NEEDED
Status: DISCONTINUED | OUTPATIENT
Start: 2022-07-02 | End: 2022-07-02 | Stop reason: HOSPADM

## 2022-07-02 RX ORDER — FLUMAZENIL 0.1 MG/ML
0.2 INJECTION INTRAVENOUS AS NEEDED
Status: DISCONTINUED | OUTPATIENT
Start: 2022-07-02 | End: 2022-07-02 | Stop reason: HOSPADM

## 2022-07-02 RX ORDER — ONDANSETRON 2 MG/ML
INJECTION INTRAMUSCULAR; INTRAVENOUS AS NEEDED
Status: DISCONTINUED | OUTPATIENT
Start: 2022-07-02 | End: 2022-07-02 | Stop reason: SURG

## 2022-07-02 RX ORDER — DEXAMETHASONE SODIUM PHOSPHATE 4 MG/ML
INJECTION, SOLUTION INTRA-ARTICULAR; INTRALESIONAL; INTRAMUSCULAR; INTRAVENOUS; SOFT TISSUE AS NEEDED
Status: DISCONTINUED | OUTPATIENT
Start: 2022-07-02 | End: 2022-07-02 | Stop reason: SURG

## 2022-07-02 RX ADMIN — Medication 10 ML: at 09:02

## 2022-07-02 RX ADMIN — SODIUM CHLORIDE 100 ML/HR: 4.5 INJECTION, SOLUTION INTRAVENOUS at 06:02

## 2022-07-02 RX ADMIN — DEXAMETHASONE SODIUM PHOSPHATE 8 MG: 4 INJECTION, SOLUTION INTRA-ARTICULAR; INTRALESIONAL; INTRAMUSCULAR; INTRAVENOUS; SOFT TISSUE at 00:23

## 2022-07-02 RX ADMIN — SALINE NASAL SPRAY 2 SPRAY: 1.5 SOLUTION NASAL at 17:44

## 2022-07-02 RX ADMIN — KETOROLAC TROMETHAMINE 30 MG: 30 INJECTION, SOLUTION INTRAMUSCULAR; INTRAVENOUS at 16:31

## 2022-07-02 RX ADMIN — DEXAMETHASONE SODIUM PHOSPHATE 4 MG: 4 INJECTION, SOLUTION INTRA-ARTICULAR; INTRALESIONAL; INTRAMUSCULAR; INTRAVENOUS; SOFT TISSUE at 08:14

## 2022-07-02 RX ADMIN — SODIUM CHLORIDE, POTASSIUM CHLORIDE, SODIUM LACTATE AND CALCIUM CHLORIDE: 600; 310; 30; 20 INJECTION, SOLUTION INTRAVENOUS at 07:48

## 2022-07-02 RX ADMIN — MORPHINE SULFATE 1 MG: 2 INJECTION, SOLUTION INTRAMUSCULAR; INTRAVENOUS at 12:54

## 2022-07-02 RX ADMIN — DEXAMETHASONE SODIUM PHOSPHATE 8 MG: 4 INJECTION, SOLUTION INTRA-ARTICULAR; INTRALESIONAL; INTRAMUSCULAR; INTRAVENOUS; SOFT TISSUE at 22:10

## 2022-07-02 RX ADMIN — SALINE NASAL SPRAY 2 SPRAY: 1.5 SOLUTION NASAL at 11:08

## 2022-07-02 RX ADMIN — PROPOFOL 200 MG: 10 INJECTION, EMULSION INTRAVENOUS at 07:51

## 2022-07-02 RX ADMIN — SODIUM CHLORIDE 100 ML/HR: 4.5 INJECTION, SOLUTION INTRAVENOUS at 16:42

## 2022-07-02 RX ADMIN — CLINDAMYCIN IN 5 PERCENT DEXTROSE 600 MG: 12 INJECTION, SOLUTION INTRAVENOUS at 00:23

## 2022-07-02 RX ADMIN — Medication 100 MG: at 07:51

## 2022-07-02 RX ADMIN — OXYCODONE HYDROCHLORIDE AND ACETAMINOPHEN 2 TABLET: 7.5; 325 TABLET ORAL at 20:09

## 2022-07-02 RX ADMIN — DEXAMETHASONE SODIUM PHOSPHATE 8 MG: 4 INJECTION, SOLUTION INTRA-ARTICULAR; INTRALESIONAL; INTRAMUSCULAR; INTRAVENOUS; SOFT TISSUE at 06:01

## 2022-07-02 RX ADMIN — CLINDAMYCIN IN 5 PERCENT DEXTROSE 600 MG: 12 INJECTION, SOLUTION INTRAVENOUS at 09:02

## 2022-07-02 RX ADMIN — OXYMETAZOLINE HYDROCHLORIDE 2 SPRAY: 0.05 SPRAY NASAL at 16:31

## 2022-07-02 RX ADMIN — SALINE NASAL SPRAY 2 SPRAY: 1.5 SOLUTION NASAL at 22:10

## 2022-07-02 RX ADMIN — DOCUSATE SODIUM 50 MG AND SENNOSIDES 8.6 MG 2 TABLET: 8.6; 5 TABLET, FILM COATED ORAL at 20:09

## 2022-07-02 RX ADMIN — MORPHINE SULFATE 1 MG: 2 INJECTION, SOLUTION INTRAMUSCULAR; INTRAVENOUS at 00:23

## 2022-07-02 RX ADMIN — DEXAMETHASONE SODIUM PHOSPHATE 8 MG: 4 INJECTION, SOLUTION INTRA-ARTICULAR; INTRALESIONAL; INTRAMUSCULAR; INTRAVENOUS; SOFT TISSUE at 16:31

## 2022-07-02 RX ADMIN — OXYMETAZOLINE HYDROCHLORIDE 2 SPRAY: 0.05 SPRAY NASAL at 20:09

## 2022-07-02 RX ADMIN — FENTANYL CITRATE 100 MCG: 50 INJECTION, SOLUTION INTRAMUSCULAR; INTRAVENOUS at 07:51

## 2022-07-02 RX ADMIN — SALINE NASAL SPRAY 2 SPRAY: 1.5 SOLUTION NASAL at 06:01

## 2022-07-02 RX ADMIN — KETOROLAC TROMETHAMINE 30 MG: 30 INJECTION, SOLUTION INTRAMUSCULAR; INTRAVENOUS at 09:31

## 2022-07-02 RX ADMIN — MIDAZOLAM 2 MG: 1 INJECTION INTRAMUSCULAR; INTRAVENOUS at 07:48

## 2022-07-02 RX ADMIN — ONDANSETRON 4 MG: 2 INJECTION INTRAMUSCULAR; INTRAVENOUS at 08:14

## 2022-07-02 RX ADMIN — CLINDAMYCIN IN 5 PERCENT DEXTROSE 600 MG: 12 INJECTION, SOLUTION INTRAVENOUS at 16:30

## 2022-07-02 NOTE — NURSING NOTE
Pt resting in bed. sats holding 98% on RA. Pt denies any needs. Pt medicated for pain per order. Pt tolerating ice cream at this time. Call bell in reach, safety maintained. father at bedside.

## 2022-07-02 NOTE — ANESTHESIA PROCEDURE NOTES
Airway  Date/Time: 7/2/2022 7:52 AM  Airway not difficult    General Information and Staff    Patient location during procedure: OR  CRNA/CAA: Len Hu CRNA    Indications and Patient Condition  Indications for airway management: airway protection    Preoxygenated: yes      Final Airway Details  Final airway type: endotracheal airway      Successful airway: ETT  Cuffed: yes   Successful intubation technique: direct laryngoscopy  Endotracheal tube insertion site: oral  Blade: Ruthie  Blade size: 3.5  ETT size (mm): 7.0  Cormack-Lehane Classification: grade I - full view of glottis  Placement verified by: chest auscultation and capnometry   Cuff volume (mL): 6  Measured from: lips  ETT/EBT  to lips (cm): 20  Number of attempts at approach: 1  Assessment: lips, teeth, and gum same as pre-op and atraumatic intubation    Additional Comments  ATRAUMATIC INTUBATION

## 2022-07-02 NOTE — OP NOTE
[facility] Otolaryngology Head and Neck Surgery  OPERATIVE NOTE  Brittney Stevens  7/2/2022    Pre-op Diagnosis:   Patient Active Problem List   Diagnosis   • Peritonsillar abscess   • Gammaherpesviral mononucleosis   • Acute tonsillitis   • Pharyngeal pain   • S/P tonsillectomy       Post-op Diagnosis:     Patient Active Problem List   Diagnosis   • Peritonsillar abscess   • Gammaherpesviral mononucleosis   • Acute tonsillitis   • Pharyngeal pain   • S/P tonsillectomy         Procedure/CPT® Codes:  Procedure(s):  PERITONSILLAR ABSCESS INCISION AND DRAINAGE  Bilateral tonsillectomy    Surgeon(s):  Rajesh Salinas Jr., MD    Anesthesia:   General    Staff:   Circulator: Paul Romero, RN  Scrub Person: Daksha Vail Janet K    Estimated Blood Loss:   Minimal    Specimens:                Specimens     ID Source Type Tests Collected By Collected At Frozen?    1 Tonsils Tissue · TISSUE / BONE CULTURE   Rajesh Salinas Jr., MD 7/2/22 8074     Description: Left tonsil abcess    This specimen was not marked as sent.    A Tonsils Tissue · TISSUE PATHOLOGY EXAM   Rajesh Salinas Jr., MD 7/2/22 0122     Description: PIN IN RIGHT TONSIL    This specimen was not marked as sent.           Drains:  none    Findings:   Tonsils: asymmetric left-sided 3+, right-sided 2+, cryptic, Adenoids: 2+  Left peritonsillar abscess identified at the superior pole with posterior fossa dissection, and cavity extending up into the lateral palate musculature    Complications:   none    Reason for the Operation:  Brittney Stevens is a 21 y.o. female who has had a history of peritonsillar abscess. An incision and drainage, tonsillectomy and adenoidectomy were recommended. The risks and benefits were explained including but not limited to early and late bleeding, infection, risks of the general anesthesia, dysphagia and poor PO intake, and voice change/VPI.  Alternatives were discussed. Questions were  asked appropriately answered.      Procedure Description:  The patient was taken back to the operating room, placed supine on the operating table and placed under anesthesia by the anesthesia staff. Once this was done a time out was performed to confirm the patient and the proper procedure.   The head was positioned. A Alisia-Bubba mouth gag inserted and opened to its widest extent. The palate was examined and no submucous cleft palate noted.  The left palate was full and pointing.  A red rubber catheter was placed through the nares and brought out through the mouth to retract the palate.  Incision and drainage of left peritonsillar abscess:  An incision was made at the superior tonsillar pole.  The tonsillar capsule was identified and dissection was carried down posterior to this.  The abscess cavity was encountered.  There was approximately 2.5 cc of pus present in the posterior superior tonsillar fossa, extending superiorly into the palate.  This was immediately drained, irrigated, cultured  Tonsillectomy: Bilateral,  Medtronic Plasma Generator   Settings- 4/5  Using meticulous dissection in the capsular plane the left and right tonsils were removed. Hemostasis was obtained.   Final hemostasis was obtained with the Medtronic Plasma Generator.  The Bubba Alisia gag was released, allowed to stand and replaced. Hemostasis was found to be satisfactory.  The procedure was terminated. Adequate hemostasis was assured prior to removing palate retraction.    The oropharynx and oral cavity were irrigated clean.  Hemostasis was satisfactory.     The operative site was inspected for retained foreign bodies and instruments.   Sponge/needle count was Correct  Hemostasis was satisfactory.  The patient was then turned over to the anesthesia team and allowed to wake from anesthesia.     Disposition: The patient was transported to the PACU in Good condition.   The patient will be returned to Regular room.    Postoperative discussion  held with: Father    DVT ASSESSMENT CARRIED OUT: Patient is in the immediate post-operative period and is not a candidate for anticoagulation therapy  Patient is at increased risk for bleeding if anticoagulant therapy is used    Rajesh Salinas Jr, MD  7/2/2022  08:17 CDT

## 2022-07-02 NOTE — ANESTHESIA PREPROCEDURE EVALUATION
Anesthesia Evaluation     Patient summary reviewed and Nursing notes reviewed   NPO Solid Status: > 8 hours  NPO Liquid Status: > 8 hours           Airway   Mallampati: I  TM distance: >3 FB  Neck ROM: full  No difficulty expected  Dental - normal exam     Pulmonary - negative pulmonary ROS and normal exam   Cardiovascular - negative cardio ROS and normal exam        Neuro/Psych- negative ROS  GI/Hepatic/Renal/Endo - negative ROS     Musculoskeletal (-) negative ROS    Abdominal  - normal exam   Substance History - negative use     OB/GYN negative ob/gyn ROS         Other        ROS/Med Hx Other: Mono infection                  Anesthesia Plan    ASA 1 - emergent     general     intravenous induction     Anesthetic plan, risks, benefits, and alternatives have been provided, discussed and informed consent has been obtained with: patient.        CODE STATUS:

## 2022-07-02 NOTE — PLAN OF CARE
Problem: Adult Inpatient Plan of Care  Goal: Plan of Care Review  Outcome: Ongoing, Progressing  Flowsheets (Taken 7/2/2022 0332)  Progress: no change  Plan of Care Reviewed With: patient  Outcome Evaluation: Patient RA. IV CDI, IVF infusing per order. IV abx infusing per order. c/o pain, see MAR. NPO at midnight for surgery in the morning. Up adlib. VSS, safety maintained.

## 2022-07-02 NOTE — NURSING NOTE
Pt back to room from recovery. Pt drowsy at this time. Pt denies any needs, states throat is sore. Call bell in reach, safety maintained.

## 2022-07-02 NOTE — ANESTHESIA POSTPROCEDURE EVALUATION
Patient: Brittney Stevens    Procedure Summary     Date: 07/02/22 Room / Location:  [facility] OR 02 /  [facility] OR    Anesthesia Start: 0748 Anesthesia Stop: 0824    Procedure: PERITONSILLAR ABSCESS INCISION AND DRAINAGE (Bilateral Throat) Diagnosis:     Surgeons: Rajesh Salinas Jr., MD Provider: Len Hu CRNA    Anesthesia Type: general ASA Status: 1 - Emergent          Anesthesia Type: general    Vitals  Vitals Value Taken Time   /90 07/02/22 0837   Temp 97.8 °F (36.6 °C) 07/02/22 0840   Pulse 66 07/02/22 0840   Resp 15 07/02/22 0840   SpO2 97 % 07/02/22 0840   Vitals shown include unvalidated device data.        Post Anesthesia Care and Evaluation    Patient location during evaluation: PACU  Patient participation: complete - patient participated  Level of consciousness: awake and alert  Pain management: adequate    Airway patency: patent  Anesthetic complications: No anesthetic complications  PONV Status: none  Cardiovascular status: acceptable and stable  Respiratory status: acceptable and unassisted  Hydration status: acceptable

## 2022-07-03 VITALS
OXYGEN SATURATION: 99 % | RESPIRATION RATE: 16 BRPM | SYSTOLIC BLOOD PRESSURE: 108 MMHG | BODY MASS INDEX: 22.86 KG/M2 | WEIGHT: 129 LBS | HEART RATE: 52 BPM | TEMPERATURE: 98 F | HEIGHT: 63 IN | DIASTOLIC BLOOD PRESSURE: 67 MMHG

## 2022-07-03 PROCEDURE — 25010000002 KETOROLAC TROMETHAMINE PER 15 MG: Performed by: OTOLARYNGOLOGY

## 2022-07-03 PROCEDURE — 99024 POSTOP FOLLOW-UP VISIT: CPT | Performed by: OTOLARYNGOLOGY

## 2022-07-03 PROCEDURE — 25010000002 DEXAMETHASONE PER 1 MG: Performed by: OTOLARYNGOLOGY

## 2022-07-03 RX ORDER — DEXAMETHASONE 4 MG/1
4 TABLET ORAL 3 TIMES DAILY
Qty: 9 TABLET | Refills: 0 | Status: SHIPPED | OUTPATIENT
Start: 2022-07-03

## 2022-07-03 RX ORDER — CLINDAMYCIN HYDROCHLORIDE 150 MG/1
150 CAPSULE ORAL 3 TIMES DAILY
Qty: 21 CAPSULE | Refills: 0 | Status: SHIPPED | OUTPATIENT
Start: 2022-07-03 | End: 2022-07-10

## 2022-07-03 RX ADMIN — DEXAMETHASONE SODIUM PHOSPHATE 8 MG: 4 INJECTION, SOLUTION INTRA-ARTICULAR; INTRALESIONAL; INTRAMUSCULAR; INTRAVENOUS; SOFT TISSUE at 04:22

## 2022-07-03 RX ADMIN — SALINE NASAL SPRAY 2 SPRAY: 1.5 SOLUTION NASAL at 06:33

## 2022-07-03 RX ADMIN — KETOROLAC TROMETHAMINE 30 MG: 30 INJECTION, SOLUTION INTRAMUSCULAR; INTRAVENOUS at 07:47

## 2022-07-03 RX ADMIN — CLINDAMYCIN IN 5 PERCENT DEXTROSE 600 MG: 12 INJECTION, SOLUTION INTRAVENOUS at 08:46

## 2022-07-03 RX ADMIN — OXYMETAZOLINE HYDROCHLORIDE 2 SPRAY: 0.05 SPRAY NASAL at 08:47

## 2022-07-03 RX ADMIN — FAMOTIDINE 40 MG: 20 TABLET, FILM COATED ORAL at 08:46

## 2022-07-03 RX ADMIN — SODIUM CHLORIDE 100 ML/HR: 4.5 INJECTION, SOLUTION INTRAVENOUS at 07:42

## 2022-07-03 RX ADMIN — CLINDAMYCIN IN 5 PERCENT DEXTROSE 600 MG: 12 INJECTION, SOLUTION INTRAVENOUS at 00:17

## 2022-07-03 NOTE — DISCHARGE INSTRUCTIONS
WRITTEN INSTRUCTIONS TO PATIENT/FAMILY:  Patient instruction sheet  Tonsillectomy/Adenoidectomy  Mono

## 2022-07-03 NOTE — PLAN OF CARE
Goal Outcome Evaluation:  Plan of Care Reviewed With: patient        Progress: improving    Pt with minimal complaints of pain during shift; given pain meds once. Possible discharge home later today. IVF and abx infusing per order. VSS. Safety maintained.

## 2022-07-05 LAB
BACTERIA SPEC AEROBE CULT: NORMAL
GRAM STN SPEC: NORMAL
GRAM STN SPEC: NORMAL

## 2022-07-06 LAB
CYTO UR: NORMAL
LAB AP CASE REPORT: NORMAL
Lab: NORMAL
PATH REPORT.FINAL DX SPEC: NORMAL
PATH REPORT.GROSS SPEC: NORMAL

## 2023-04-15 ENCOUNTER — APPOINTMENT (OUTPATIENT)
Dept: CT IMAGING | Facility: HOSPITAL | Age: 23
End: 2023-04-15
Payer: COMMERCIAL

## 2023-04-15 ENCOUNTER — HOSPITAL ENCOUNTER (INPATIENT)
Facility: HOSPITAL | Age: 23
LOS: 4 days | Discharge: HOME | End: 2023-04-19
Attending: FAMILY MEDICINE | Admitting: FAMILY MEDICINE
Payer: COMMERCIAL

## 2023-04-15 ENCOUNTER — APPOINTMENT (OUTPATIENT)
Dept: GENERAL RADIOLOGY | Facility: HOSPITAL | Age: 23
End: 2023-04-15
Payer: COMMERCIAL

## 2023-04-15 DIAGNOSIS — Z78.9 FAILURE OF OUTPATIENT TREATMENT: ICD-10-CM

## 2023-04-15 DIAGNOSIS — J18.9 PNEUMONIA OF RIGHT LUNG DUE TO INFECTIOUS ORGANISM, UNSPECIFIED PART OF LUNG: Primary | ICD-10-CM

## 2023-04-15 LAB
ALBUMIN SERPL-MCNC: 4.2 G/DL (ref 3.5–5.2)
ALBUMIN/GLOB SERPL: 1.4 G/DL
ALP SERPL-CCNC: 71 U/L (ref 39–117)
ALT SERPL W P-5'-P-CCNC: 12 U/L (ref 1–33)
ANION GAP SERPL CALCULATED.3IONS-SCNC: 17 MMOL/L (ref 5–15)
AST SERPL-CCNC: 20 U/L (ref 1–32)
B-HCG UR QL: NEGATIVE
BACTERIA UR QL AUTO: ABNORMAL /HPF
BASOPHILS # BLD AUTO: 0.01 10*3/MM3 (ref 0–0.2)
BASOPHILS NFR BLD AUTO: 0.2 % (ref 0–1.5)
BILIRUB SERPL-MCNC: 0.5 MG/DL (ref 0–1.2)
BILIRUB UR QL STRIP: NEGATIVE
BUN SERPL-MCNC: 11 MG/DL (ref 6–20)
BUN/CREAT SERPL: 13.8 (ref 7–25)
CALCIUM SPEC-SCNC: 8.9 MG/DL (ref 8.6–10.5)
CHLORIDE SERPL-SCNC: 95 MMOL/L (ref 98–107)
CLARITY UR: CLEAR
CO2 SERPL-SCNC: 22 MMOL/L (ref 22–29)
COLOR UR: ABNORMAL
CREAT SERPL-MCNC: 0.8 MG/DL (ref 0.57–1)
CRP SERPL-MCNC: 15.87 MG/DL (ref 0–0.5)
D DIMER PPP FEU-MCNC: 1.05 MCGFEU/ML (ref 0–0.5)
D-LACTATE SERPL-SCNC: 0.7 MMOL/L (ref 0.5–2)
DEPRECATED RDW RBC AUTO: 40.4 FL (ref 37–54)
EGFRCR SERPLBLD CKD-EPI 2021: 106.3 ML/MIN/1.73
EOSINOPHIL # BLD AUTO: 0 10*3/MM3 (ref 0–0.4)
EOSINOPHIL NFR BLD AUTO: 0 % (ref 0.3–6.2)
ERYTHROCYTE [DISTWIDTH] IN BLOOD BY AUTOMATED COUNT: 12.7 % (ref 12.3–15.4)
EXPIRATION DATE: NORMAL
FLUAV RNA RESP QL NAA+PROBE: NOT DETECTED
FLUBV RNA RESP QL NAA+PROBE: NOT DETECTED
GLOBULIN UR ELPH-MCNC: 2.9 GM/DL
GLUCOSE SERPL-MCNC: 88 MG/DL (ref 65–99)
GLUCOSE UR STRIP-MCNC: NEGATIVE MG/DL
HCT VFR BLD AUTO: 40.8 % (ref 34–46.6)
HGB BLD-MCNC: 13.1 G/DL (ref 12–15.9)
HGB UR QL STRIP.AUTO: ABNORMAL
HYALINE CASTS UR QL AUTO: ABNORMAL /LPF
IMM GRANULOCYTES # BLD AUTO: 0.01 10*3/MM3 (ref 0–0.05)
IMM GRANULOCYTES NFR BLD AUTO: 0.2 % (ref 0–0.5)
INTERNAL NEGATIVE CONTROL: NEGATIVE
INTERNAL POSITIVE CONTROL: POSITIVE
KETONES UR QL STRIP: ABNORMAL
L PNEUMO1 AG UR QL IA: NEGATIVE
LEUKOCYTE ESTERASE UR QL STRIP.AUTO: NEGATIVE
LYMPHOCYTES # BLD AUTO: 0.62 10*3/MM3 (ref 0.7–3.1)
LYMPHOCYTES NFR BLD AUTO: 9.6 % (ref 19.6–45.3)
Lab: NORMAL
MCH RBC QN AUTO: 27.9 PG (ref 26.6–33)
MCHC RBC AUTO-ENTMCNC: 32.1 G/DL (ref 31.5–35.7)
MCV RBC AUTO: 87 FL (ref 79–97)
MONOCYTES # BLD AUTO: 0.78 10*3/MM3 (ref 0.1–0.9)
MONOCYTES NFR BLD AUTO: 12.1 % (ref 5–12)
MRSA DNA SPEC QL NAA+PROBE: NORMAL
MUCOUS THREADS URNS QL MICRO: ABNORMAL /HPF
NEUTROPHILS NFR BLD AUTO: 5.01 10*3/MM3 (ref 1.7–7)
NEUTROPHILS NFR BLD AUTO: 77.9 % (ref 42.7–76)
NITRITE UR QL STRIP: NEGATIVE
NRBC BLD AUTO-RTO: 0 /100 WBC (ref 0–0.2)
PH UR STRIP.AUTO: 5.5 [PH] (ref 5–8)
PLATELET # BLD AUTO: 166 10*3/MM3 (ref 140–450)
PMV BLD AUTO: 10.7 FL (ref 6–12)
POTASSIUM SERPL-SCNC: 3.3 MMOL/L (ref 3.5–5.2)
PROCALCITONIN SERPL-MCNC: 0.43 NG/ML (ref 0–0.25)
PROT SERPL-MCNC: 7.1 G/DL (ref 6–8.5)
PROT UR QL STRIP: ABNORMAL
RBC # BLD AUTO: 4.69 10*6/MM3 (ref 3.77–5.28)
RBC # UR STRIP: ABNORMAL /HPF
REF LAB TEST METHOD: ABNORMAL
RSV RNA NPH QL NAA+NON-PROBE: NOT DETECTED
S PNEUM AG SPEC QL LA: NEGATIVE
SARS-COV-2 RNA RESP QL NAA+PROBE: NOT DETECTED
SODIUM SERPL-SCNC: 134 MMOL/L (ref 136–145)
SP GR UR STRIP: 1.03 (ref 1–1.03)
SQUAMOUS #/AREA URNS HPF: ABNORMAL /HPF
UROBILINOGEN UR QL STRIP: ABNORMAL
WBC # UR STRIP: ABNORMAL /HPF
WBC NRBC COR # BLD: 6.43 10*3/MM3 (ref 3.4–10.8)

## 2023-04-15 PROCEDURE — 87637 SARSCOV2&INF A&B&RSV AMP PRB: CPT | Performed by: NURSE PRACTITIONER

## 2023-04-15 PROCEDURE — 94640 AIRWAY INHALATION TREATMENT: CPT

## 2023-04-15 PROCEDURE — 87899 AGENT NOS ASSAY W/OPTIC: CPT | Performed by: FAMILY MEDICINE

## 2023-04-15 PROCEDURE — 81025 URINE PREGNANCY TEST: CPT | Performed by: NURSE PRACTITIONER

## 2023-04-15 PROCEDURE — 87641 MR-STAPH DNA AMP PROBE: CPT | Performed by: FAMILY MEDICINE

## 2023-04-15 PROCEDURE — 25010000002 AZITHROMYCIN PER 500 MG: Performed by: NURSE PRACTITIONER

## 2023-04-15 PROCEDURE — 83605 ASSAY OF LACTIC ACID: CPT | Performed by: NURSE PRACTITIONER

## 2023-04-15 PROCEDURE — 87040 BLOOD CULTURE FOR BACTERIA: CPT | Performed by: NURSE PRACTITIONER

## 2023-04-15 PROCEDURE — 71046 X-RAY EXAM CHEST 2 VIEWS: CPT

## 2023-04-15 PROCEDURE — 87581 M.PNEUMON DNA AMP PROBE: CPT | Performed by: FAMILY MEDICINE

## 2023-04-15 PROCEDURE — 94799 UNLISTED PULMONARY SVC/PX: CPT

## 2023-04-15 PROCEDURE — 71275 CT ANGIOGRAPHY CHEST: CPT

## 2023-04-15 PROCEDURE — 25010000002 CEFTRIAXONE PER 250 MG: Performed by: NURSE PRACTITIONER

## 2023-04-15 PROCEDURE — 25010000002 CEFTRIAXONE PER 250 MG: Performed by: FAMILY MEDICINE

## 2023-04-15 PROCEDURE — 36415 COLL VENOUS BLD VENIPUNCTURE: CPT

## 2023-04-15 PROCEDURE — 81001 URINALYSIS AUTO W/SCOPE: CPT | Performed by: NURSE PRACTITIONER

## 2023-04-15 PROCEDURE — 85025 COMPLETE CBC W/AUTO DIFF WBC: CPT | Performed by: NURSE PRACTITIONER

## 2023-04-15 PROCEDURE — 94760 N-INVAS EAR/PLS OXIMETRY 1: CPT

## 2023-04-15 PROCEDURE — 87086 URINE CULTURE/COLONY COUNT: CPT | Performed by: NURSE PRACTITIONER

## 2023-04-15 PROCEDURE — 86140 C-REACTIVE PROTEIN: CPT | Performed by: NURSE PRACTITIONER

## 2023-04-15 PROCEDURE — 25510000001 IOPAMIDOL PER 1 ML: Performed by: NURSE PRACTITIONER

## 2023-04-15 PROCEDURE — 80053 COMPREHEN METABOLIC PANEL: CPT | Performed by: NURSE PRACTITIONER

## 2023-04-15 PROCEDURE — 87449 NOS EACH ORGANISM AG IA: CPT | Performed by: FAMILY MEDICINE

## 2023-04-15 PROCEDURE — 85379 FIBRIN DEGRADATION QUANT: CPT | Performed by: NURSE PRACTITIONER

## 2023-04-15 PROCEDURE — 25010000002 METHYLPREDNISOLONE PER 125 MG: Performed by: NURSE PRACTITIONER

## 2023-04-15 PROCEDURE — 84145 PROCALCITONIN (PCT): CPT | Performed by: NURSE PRACTITIONER

## 2023-04-15 PROCEDURE — 99285 EMERGENCY DEPT VISIT HI MDM: CPT

## 2023-04-15 RX ORDER — IPRATROPIUM BROMIDE AND ALBUTEROL SULFATE 2.5; .5 MG/3ML; MG/3ML
3 SOLUTION RESPIRATORY (INHALATION) ONCE
Status: COMPLETED | OUTPATIENT
Start: 2023-04-15 | End: 2023-04-15

## 2023-04-15 RX ORDER — BENZONATATE 100 MG/1
200 CAPSULE ORAL 3 TIMES DAILY PRN
Status: DISCONTINUED | OUTPATIENT
Start: 2023-04-15 | End: 2023-04-19 | Stop reason: HOSPADM

## 2023-04-15 RX ORDER — ACETAMINOPHEN 500 MG
1000 TABLET ORAL ONCE
Status: COMPLETED | OUTPATIENT
Start: 2023-04-15 | End: 2023-04-15

## 2023-04-15 RX ORDER — SODIUM CHLORIDE 0.9 % (FLUSH) 0.9 %
10 SYRINGE (ML) INJECTION EVERY 12 HOURS SCHEDULED
Status: DISCONTINUED | OUTPATIENT
Start: 2023-04-15 | End: 2023-04-17

## 2023-04-15 RX ORDER — SODIUM CHLORIDE 9 MG/ML
40 INJECTION, SOLUTION INTRAVENOUS AS NEEDED
Status: DISCONTINUED | OUTPATIENT
Start: 2023-04-15 | End: 2023-04-19 | Stop reason: HOSPADM

## 2023-04-15 RX ORDER — SODIUM CHLORIDE, SODIUM LACTATE, POTASSIUM CHLORIDE, CALCIUM CHLORIDE 600; 310; 30; 20 MG/100ML; MG/100ML; MG/100ML; MG/100ML
125 INJECTION, SOLUTION INTRAVENOUS CONTINUOUS
Status: DISCONTINUED | OUTPATIENT
Start: 2023-04-15 | End: 2023-04-17

## 2023-04-15 RX ORDER — ONDANSETRON 2 MG/ML
4 INJECTION INTRAMUSCULAR; INTRAVENOUS EVERY 6 HOURS PRN
Status: DISCONTINUED | OUTPATIENT
Start: 2023-04-15 | End: 2023-04-19 | Stop reason: HOSPADM

## 2023-04-15 RX ORDER — METHYLPREDNISOLONE SODIUM SUCCINATE 125 MG/2ML
125 INJECTION, POWDER, LYOPHILIZED, FOR SOLUTION INTRAMUSCULAR; INTRAVENOUS ONCE
Status: COMPLETED | OUTPATIENT
Start: 2023-04-15 | End: 2023-04-15

## 2023-04-15 RX ORDER — POTASSIUM CHLORIDE 750 MG/1
40 CAPSULE, EXTENDED RELEASE ORAL ONCE
Status: COMPLETED | OUTPATIENT
Start: 2023-04-15 | End: 2023-04-15

## 2023-04-15 RX ORDER — SODIUM CHLORIDE 0.9 % (FLUSH) 0.9 %
10 SYRINGE (ML) INJECTION AS NEEDED
Status: DISCONTINUED | OUTPATIENT
Start: 2023-04-15 | End: 2023-04-19 | Stop reason: HOSPADM

## 2023-04-15 RX ADMIN — IPRATROPIUM BROMIDE 0.5 MG: 0.5 SOLUTION RESPIRATORY (INHALATION) at 19:51

## 2023-04-15 RX ADMIN — POTASSIUM CHLORIDE 40 MEQ: 10 CAPSULE, COATED, EXTENDED RELEASE ORAL at 16:36

## 2023-04-15 RX ADMIN — ALBUTEROL SULFATE 1.25 MG: 2.5 SOLUTION RESPIRATORY (INHALATION) at 14:52

## 2023-04-15 RX ADMIN — SODIUM CHLORIDE, POTASSIUM CHLORIDE, SODIUM LACTATE AND CALCIUM CHLORIDE 1000 ML: 600; 310; 30; 20 INJECTION, SOLUTION INTRAVENOUS at 13:19

## 2023-04-15 RX ADMIN — CEFTRIAXONE 1 G: 1 INJECTION, POWDER, FOR SOLUTION INTRAMUSCULAR; INTRAVENOUS at 10:40

## 2023-04-15 RX ADMIN — CEFTRIAXONE 1 G: 1 INJECTION, POWDER, FOR SOLUTION INTRAMUSCULAR; INTRAVENOUS at 16:37

## 2023-04-15 RX ADMIN — ALBUTEROL SULFATE 1.25 MG: 2.5 SOLUTION RESPIRATORY (INHALATION) at 19:51

## 2023-04-15 RX ADMIN — ACETAMINOPHEN 1000 MG: 500 TABLET, FILM COATED ORAL at 11:14

## 2023-04-15 RX ADMIN — METHYLPREDNISOLONE SODIUM SUCCINATE 125 MG: 125 INJECTION, POWDER, FOR SOLUTION INTRAMUSCULAR; INTRAVENOUS at 08:56

## 2023-04-15 RX ADMIN — IPRATROPIUM BROMIDE 0.5 MG: 0.5 SOLUTION RESPIRATORY (INHALATION) at 14:52

## 2023-04-15 RX ADMIN — SODIUM CHLORIDE, POTASSIUM CHLORIDE, SODIUM LACTATE AND CALCIUM CHLORIDE 1000 ML: 600; 310; 30; 20 INJECTION, SOLUTION INTRAVENOUS at 08:56

## 2023-04-15 RX ADMIN — IPRATROPIUM BROMIDE AND ALBUTEROL SULFATE 3 ML: .5; 3 SOLUTION RESPIRATORY (INHALATION) at 08:20

## 2023-04-15 RX ADMIN — IOPAMIDOL 85 ML: 755 INJECTION, SOLUTION INTRAVENOUS at 11:41

## 2023-04-15 RX ADMIN — SODIUM CHLORIDE, POTASSIUM CHLORIDE, SODIUM LACTATE AND CALCIUM CHLORIDE 125 ML/HR: 600; 310; 30; 20 INJECTION, SOLUTION INTRAVENOUS at 16:33

## 2023-04-15 RX ADMIN — AZITHROMYCIN DIHYDRATE 500 MG: 500 INJECTION, POWDER, LYOPHILIZED, FOR SOLUTION INTRAVENOUS at 11:14

## 2023-04-15 NOTE — Clinical Note
Level of Care: Med/Surg [1]   Diagnosis: Pneumonia of right lung due to infectious organism, unspecified part of lung [[id number]]   Admitting Physician: SAMIRA MONTOYA [[id number]]   Attending Physician: SAMIRA MONTOYA [[id number]]

## 2023-04-15 NOTE — H&P
[facility] Medicine Services  HISTORY AND PHYSICAL    Date of Admission: 4/15/2023  Primary Care Physician: Sam Smith MD    Subjective   Primary Historian: Patient and father.    Chief Complaint: Pneumonia/fever.    History of Present Illness  Patient is a 22-year-old presented to ER with complaining coughing for last 6 days.  Patient went to [facility] on Monday, 4/9/2023 was evaluated and was given Levaquin and [place] Dosepak to go home with.  Patient has not been better, patient went to clinic yesterday and got a shot of Rocephin and cough medication with discharge home.  Patient came to ER today with continue of coughing and not feeling any better.  CT scan shows right lung pneumonia.  Patient pain and weakness fever and chills with it.  Patient received Rocephin and a azithromycin in the ER.  Patient will receive neb treatment and 2 L bolus and a Solu-Medrol IV in ER.    Review of Systems   Constitutional: Positive for activity change, appetite change, chills, fatigue and fever.   HENT: Negative for hearing loss, nosebleeds, tinnitus and trouble swallowing.    Eyes: Negative for visual disturbance.   Respiratory: Positive for cough and shortness of breath. Negative for chest tightness and wheezing.    Cardiovascular: Negative for chest pain, palpitations and leg swelling.   Gastrointestinal: Negative for abdominal distention, abdominal pain, blood in stool, constipation, diarrhea, nausea and vomiting.   Endocrine: Negative for cold intolerance, heat intolerance, polydipsia, polyphagia and polyuria.   Genitourinary: Negative for decreased urine volume, difficulty urinating, dysuria, flank pain, frequency and hematuria.   Musculoskeletal: Negative for arthralgias, joint swelling and myalgias.   Skin: Negative for rash.   Allergic/Immunologic: Negative for immunocompromised state.   Neurological: Positive for weakness. Negative for dizziness, syncope, light-headedness  "and headaches.   Hematological: Negative for adenopathy. Does not bruise/bleed easily.   Psychiatric/Behavioral: Negative for confusion and sleep disturbance. The patient is not nervous/anxious.       Otherwise complete ROS reviewed and negative except as mentioned in the HPI.    Past Medical History: History reviewed. No pertinent past medical history.  Past Surgical History:  Past Surgical History:   Procedure Laterality Date   • DENTAL PROCEDURE     • PERITONSILLAR ABSCESS INCISION AND DRAINAGE Bilateral 07/02/2022    Procedure: PERITONSILLAR ABSCESS INCISION AND DRAINAGE;  Surgeon: Rajesh Salinas Jr., MD;  Location: [facility];  Service: ENT;  Laterality: Bilateral;   • TONSILLECTOMY       Social History:  reports that she has never smoked. She has never used smokeless tobacco. She reports that she does not drink alcohol and does not use drugs.    Family History: family history includes Lung cancer in her maternal grandfather.       Allergies:  No Known Allergies    Medications:  Prior to Admission medications    Medication Sig Start Date End Date Taking? Authorizing Provider   dexamethasone (DECADRON) 4 MG tablet Take 1 tablet by mouth 3 (Three) Times a Day. 7/3/22   Rajesh Salinas Jr., MD Taytulla 1-20 MG-MCG(24) capsule TAKE 1 CAPSULE BY MOUTH DAILY 9/8/21   Ijeoma Ross APRN     I have utilized all available immediate resources to obtain, update, or review the patient's current medications (including all prescriptions, over-the-counter products, herbals, cannabis/cannabidiol products, and vitamin/mineral/dietary (nutritional) supplements).    Objective     Vital Signs: BP 94/56 (BP Location: Left arm, Patient Position: Lying)   Pulse 88   Temp 98.3 °F (36.8 °C) (Oral)   Resp 16   Ht 160 cm (63\")   Wt 54.4 kg (120 lb)   LMP 04/15/2023   SpO2 96%   BMI 21.26 kg/m²   Physical Exam  Vitals and nursing note reviewed.   HENT:      Head: Normocephalic.   Eyes:      Conjunctiva/sclera: " Conjunctivae normal.      Pupils: Pupils are equal, round, and reactive to light.   Neck:      Vascular: No JVD.   Cardiovascular:      Rate and Rhythm: Normal rate and regular rhythm.      Heart sounds: Normal heart sounds.   Pulmonary:      Effort: Pulmonary effort is normal. No respiratory distress.      Breath sounds: No wheezing or rales.      Comments: Crackles right lower base.  Chest:      Chest wall: No tenderness.   Abdominal:      General: Bowel sounds are normal. There is no distension.      Palpations: Abdomen is soft.      Tenderness: There is no abdominal tenderness.   Musculoskeletal:         General: No tenderness or deformity. Normal range of motion.      Cervical back: Neck supple.   Skin:     General: Skin is warm and dry.      Findings: No rash.   Neurological:      Mental Status: She is alert and oriented to person, place, and time.      Cranial Nerves: No cranial nerve deficit.      Motor: No abnormal muscle tone.      Deep Tendon Reflexes: Reflexes normal.   Psychiatric:         Mood and Affect: Mood normal.         Behavior: Behavior normal.         Thought Content: Thought content normal.         Judgment: Judgment normal.              Results Reviewed:  Lab Results (last 24 hours)     Procedure Component Value Units Date/Time    Lactic Acid, Plasma [[id number]]  (Normal) Collected: 04/15/23 1039    Specimen: Blood Updated: 04/15/23 1100     Lactate 0.7 mmol/L     Blood Culture With NANCY - Blood, Arm, Right [[id number]] Collected: 04/15/23 0855    Specimen: Blood from Arm, Right Updated: 04/15/23 1052    COVID PRE-OP / PRE-PROCEDURE SCREENING ORDER (NO ISOLATION) - Swab, Nasopharynx [[id number]]  (Normal) Collected: 04/15/23 0858    Specimen: Swab from Nasopharynx Updated: 04/15/23 0958    Narrative:      The following orders were created for panel order COVID PRE-OP / PRE-PROCEDURE SCREENING ORDER (NO ISOLATION) - Swab, Nasopharynx.  Procedure                               Abnormality          "Status                     ---------                               -----------         ------                     COVID-19, FLU A/B, RSV P...[[id number]]  Normal              Final result                 Please view results for these tests on the individual orders.    COVID-19, FLU A/B, RSV PCR - Swab, Nasopharynx [[id number]]  (Normal) Collected: 04/15/23 0858    Specimen: Swab from Nasopharynx Updated: 04/15/23 0958     COVID19 Not Detected     Influenza A PCR Not Detected     Influenza B PCR Not Detected     RSV, PCR Not Detected    Narrative:      Fact sheet for providers: https://www.fda.gov/media/676048/download    Fact sheet for patients: https://www.fda.gov/media/455211/download    Test performed by PCR.    Procalcitonin [[id number]]  (Abnormal) Collected: 04/15/23 0850    Specimen: Blood Updated: 04/15/23 0938     Procalcitonin 0.43 ng/mL     Narrative:      As a Marker for Sepsis (Non-Neonates):    1. <0.5 ng/mL represents a low risk of severe sepsis and/or septic shock.  2. >2 ng/mL represents a high risk of severe sepsis and/or septic shock.    As a Marker for Lower Respiratory Tract Infections that require antibiotic therapy:    PCT on Admission    Antibiotic Therapy       6-12 Hrs later    >0.5                Strongly Recommended  >0.25 - <0.5        Recommended   0.1 - 0.25          Discouraged              Remeasure/reassess PCT  <0.1                Strongly Discouraged     Remeasure/reassess PCT    As 28 day mortality risk marker: \"Change in Procalcitonin Result\" (>80% or <=80%) if Day 0 (or Day 1) and Day 4 values are available. Refer to http://www[place] [place]    Change in PCT <=80%  A decrease of PCT levels below or equal to 80% defines a positive change in PCT test result representing a higher risk for 28-day all-cause mortality of patients diagnosed with severe sepsis for septic shock.    Change in PCT >80%  A decrease of PCT levels of more than 80% defines a negative change in PCT " result representing a lower risk for 28-day all-cause mortality of patients diagnosed with severe sepsis or septic shock.       C-reactive Protein [[id number]]  (Abnormal) Collected: 04/15/23 0850    Specimen: Blood Updated: 04/15/23 0933     C-Reactive Protein 15.87 mg/dL     Comprehensive Metabolic Panel [[id number]]  (Abnormal) Collected: 04/15/23 0850    Specimen: Blood Updated: 04/15/23 0933     Glucose 88 mg/dL      BUN 11 mg/dL      Creatinine 0.80 mg/dL      Sodium 134 mmol/L      Potassium 3.3 mmol/L      Chloride 95 mmol/L      CO2 22.0 mmol/L      Calcium 8.9 mg/dL      Total Protein 7.1 g/dL      Albumin 4.2 g/dL      ALT (SGPT) 12 U/L      AST (SGOT) 20 U/L      Alkaline Phosphatase 71 U/L      Total Bilirubin 0.5 mg/dL      Globulin 2.9 gm/dL      A/G Ratio 1.4 g/dL      BUN/Creatinine Ratio 13.8     Anion Gap 17.0 mmol/L      eGFR 106.3 mL/min/1.73     Narrative:      GFR Normal >60  Chronic Kidney Disease <60  Kidney Failure <15      Urinalysis With Culture If Indicated - Urine, Clean Catch [[id number]]  (Abnormal) Collected: 04/15/23 0850    Specimen: Urine, Clean Catch Updated: 04/15/23 0925     Color, UA Dark Yellow     Appearance, UA Clear     pH, UA 5.5     Specific Gravity, UA 1.028     Glucose, UA Negative     Ketones, UA 80 mg/dL (3+)     Bilirubin, UA Negative     Blood, UA Moderate (2+)     Protein, UA 30 mg/dL (1+)     Leuk Esterase, UA Negative     Nitrite, UA Negative     Urobilinogen, UA 0.2 E.U./dL    Narrative:      In absence of clinical symptoms, the presence of pyuria, bacteria, and/or nitrites on the urinalysis result does not correlate with infection.    Urinalysis, Microscopic Only - Urine, Clean Catch [[id number]]  (Abnormal) Collected: 04/15/23 0850    Specimen: Urine, Clean Catch Updated: 04/15/23 0925     RBC, UA None Seen /HPF      WBC, UA 0-2 /HPF      Comment: Urine culture not indicated.        Bacteria, UA 1+ /HPF      Squamous Epithelial Cells, UA 3-6 /HPF      Hyaline  "Casts, UA 0-2 /LPF      Mucus, UA Small/1+ /HPF      Methodology Manual Light Microscopy    D-dimer, Quantitative [[id number]]  (Abnormal) Collected: 04/15/23 0850    Specimen: Blood Updated: 04/15/23 0923     D-Dimer, Quantitative 1.05 MCGFEU/mL     Narrative:      According to the assay 's published package insert, a normal (<0.50 MCGFEU/mL) D-dimer result in conjunction with a non-high clinical probability assessment, excludes deep vein thrombosis (DVT) and pulmonary embolism (PE) with high sensitivity.    D-dimer values increase with age and this can make VTE exclusion of an older population difficult. To address this, the [employer], based on best available evidence and recent guidelines, recommends that clinicians use age-adjusted D-dimer thresholds in patients greater than 50 years of age with: a) a low probability of PE who do not meet all Pulmonary Embolism Rule Out Criteria, or b) in those with intermediate probability of PE.   The formula for an age-adjusted D-dimer cut-off is \"age/100\".  For example, a 60 year old patient would have an age-adjusted cut-off of 0.60 MCGFEU/mL and an 80 year old 0.80 MCGFEU/mL.    Blood Culture With NANCY - Blood, Arm, Right [[id number]] Collected: 04/15/23 0850    Specimen: Blood from Arm, Right Updated: 04/15/23 0915    CBC & Differential [[id number]]  (Abnormal) Collected: 04/15/23 0850    Specimen: Blood Updated: 04/15/23 0911    Narrative:      The following orders were created for panel order CBC & Differential.  Procedure                               Abnormality         Status                     ---------                               -----------         ------                     CBC Auto Differential[[id number]]        Abnormal            Final result                 Please view results for these tests on the individual orders.    CBC Auto Differential [[id number]]  (Abnormal) Collected: 04/15/23 0850    Specimen: Blood Updated: 04/15/23 " 0911     WBC 6.43 10*3/mm3      RBC 4.69 10*6/mm3      Hemoglobin 13.1 g/dL      Hematocrit 40.8 %      MCV 87.0 fL      MCH 27.9 pg      MCHC 32.1 g/dL      RDW 12.7 %      RDW-SD 40.4 fl      MPV 10.7 fL      Platelets 166 10*3/mm3      Neutrophil % 77.9 %      Lymphocyte % 9.6 %      Monocyte % 12.1 %      Eosinophil % 0.0 %      Basophil % 0.2 %      Immature Grans % 0.2 %      Neutrophils, Absolute 5.01 10*3/mm3      Lymphocytes, Absolute 0.62 10*3/mm3      Monocytes, Absolute 0.78 10*3/mm3      Eosinophils, Absolute 0.00 10*3/mm3      Basophils, Absolute 0.01 10*3/mm3      Immature Grans, Absolute 0.01 10*3/mm3      nRBC 0.0 /100 WBC     POC Pregnancy, Urine [[id number]]  (Normal) Collected: 04/15/23 0902    Specimen: Urine Updated: 04/15/23 0903     HCG, Urine, QL Negative     Lot Number [id number]     Internal Positive Control Positive     Internal Negative Control Negative     Expiration Date 4/30/2020        Imaging Results (Last 24 Hours)     Procedure Component Value Units Date/Time    CT Angiogram Chest [[id number]] Collected: 04/15/23 1151     Updated: 04/15/23 1201    Narrative:      CT ANGIOGRAM CHEST- 4/15/2023 11:38 AM CDT      HISTORY: dyspnea/ tachycardia/ elevated dimer      COMPARISON: Chest x-ray dated 04/15/2023.      DOSE LENGTH PRODUCT: 112 mGy cm. Automated exposure control was also  utilized to decrease patient radiation dose.     TECHNIQUE: Helical tomographic images of the chest were obtained after  the administration of intravenous contrast following angiogram protocol.  Additionally, 3D and multiplanar reformatted images were provided.        FINDINGS:    Pulmonary arteries: There is adequate enhancement of the pulmonary  arteries to evaluate for central and segmental pulmonary emboli. There  are no filling defects within the main, lobar, segmental or visualized  subsegmental pulmonary arteries. The pulmonary arteries are within  normal limits for size.      Aorta and great vessels:  Thoracic aorta is normal in caliber. No  dissection identified. No flow-limiting stenosis identified at the great  vessel origins.     Visualized neck base: The imaged portion of the base of the neck appears  unremarkable.      Lungs: 9 cm right lower lobe consolidation, laterally, concerning for  pneumonia. Lungs are otherwise clear. The lungs are well expanded. No  pleural effusion. Airways are clear.     Heart: The heart is normal in size. There is no pericardial effusion.     Mediastinum and lymph nodes: Mild reactive adenopathy in the right hilum  and mediastinum..     Skeletal and soft tissues: Chest wall soft tissues are unremarkable. No  acute bony abnormality. Thoracic spine degenerative change.     Upper abdomen: The imaged portion of the upper abdomen demonstrates no  acute process.        Impression:      1. 9 cm consolidation in the lateral portion of the right lower lobe  which is likely pneumonia. Reactive adenopathy in the right hilum and  mediastinum.        This report was finalized on 04/15/2023 11:58 by Dr Mauricio Welch, .    XR Chest 2 View [[id number]] Collected: 04/15/23 0846     Updated: 04/15/23 0849    Narrative:      EXAMINATION: Chest 2 views 04/15/2023     HISTORY: Cough.     FINDINGS: Two-view exam of the chest demonstrates a right lower lobe  pneumonia with partial silhouetting of the right diaphragm. Lungs are  otherwise clear. No effusion or free air is present.       Impression:      1. Right lower lobe pneumonia.  This report was finalized on 04/15/2023 08:46 by Dr. Clarence Riley MD.        I have personally reviewed and interpreted the radiology studies and ECG obtained at time of admission.     Assessment / Plan   Assessment:   Active Hospital Problems    Diagnosis    • **Pneumonia of right lung due to infectious organism, unspecified part of lung        Treatment Plan  The patient will be admitted to my service here at [facility].     Pneumonia/fever.  Failed  outpatient treatment.  Patient went to [place] [facility] 4/9/2023-received Levaquin, Medrol Dosepak, and cough medication and was sent home.  Patient went to see a clinic yesterday at [facility] was given Rocephin and was sent home.  Patient came to ER today with similar symptoms.  Continue Rocephin and azithromycin.  Pneumonia protocol for now.  Tessalon Perle as needed.  Incentive spirometer.  Chest x-ray-Right lower lobe pneumonia.  CTA of the chest-9 cm consolidation in the lateral portion of the right lower lobe which is likely pneumonia, Reactive adenopathy in the right hilum and mediastinum.  Patient is on room air.    Hypotension.  IV fluids.    Hyponatremia.  Lactated ringer.    Hypokalemia.  Potassium p.o.     Nauseous.  Zofran as needed.    Nutrition.  Regular/house diet.    Blood cultures pending.  Legionella antigen.  MRSA screen.  Mycoplasma pneumonia.  Respiratory culture.  Respiratory PCR.  Strep pneumo.    Medical Decision Making  Number and Complexity of problems: Pneumonia/fever.  Differential Diagnosis: None.    Conditions and Status      Ongoing treatment.     [facility] Data  External documents reviewed: Previous note  Cardiac tracing (EKG, telemetry) interpretation: Sinus .  Radiology interpretation: X-ray/CT scan  Labs reviewed: Laboratory  Any tests that were considered but not ordered: Lab in a.m.     Decision rules/scores evaluated (example [id number], [place], etc): None.     Discussed with: Patient and father     Care Planning  Shared decision making: Patient and father  Code status and discussions: Full code    Disposition  Social Determinants of Health that impact treatment or disposition: From home  Estimated length of stay is 2 to 5 days.     I confirmed that the patient's advanced care plan is present, code status is documented, and a surrogate decision maker is listed in the patient's medical record.     The patient's surrogate decision maker is father Ted.    The patient was seen and examined by me  on 4/15/2023 at 1415.  Incentive stronger.  Electronically signed by Tate Nash MD, 04/15/23, 14:31 CDT.

## 2023-04-15 NOTE — PLAN OF CARE
Goal Outcome Evaluation:  Plan of Care Reviewed With: patient        Progress: improving   VSS , BP still soft, potassium replaced, low grade fever, alert and oriented. Dr is looking to release patient 24 hours after fever is gone.

## 2023-04-15 NOTE — ED PROVIDER NOTES
Subjective   History of Present Illness  Patient is a 22-year-old white female presents to the emergency department with cough for the past 6 days.  Patient was seen at [facility] 6 days ago with abdominal pain and had a CAT scan of her abdomen pelvis and advised that she had pneumonia.  She states she had a mild cough then.  She states she was started on Levaquin and albuterol inhaler.  She states she continued to get worse throughout the week.  She was seen at a primary care office yesterday because she was not doing any better.  She received an injection of Rocephin.  No repeat ancillary tests were done.  She was given a cough medication has not helped.  She states she feels very weak and continues to have cough.  She states she is getting worse.  She continues to run fever.  She does have some shortness of breath with exertion.  She states her cough is nonproductive.  She has had intermittent nausea without vomiting.  She has had a decreased appetite has not been eating or drinking over the last few days.  She denies any chest pain.  She states that she has not been swab for COVID or any other viral illness.  She is not for sure what her labs had shown at [facility].    History provided by:  Patient   used: No        Review of Systems   Constitutional: Negative.    HENT: Negative.    Eyes: Negative.    Respiratory:        Patient is a 22-year-old white female presents to the emergency department with cough for the past 6 days.  Patient was seen at [facility] 6 days ago with abdominal pain and had a CAT scan of her abdomen pelvis and advised that she had pneumonia.  She states she had a mild cough then.  She states she was started on Levaquin and albuterol inhaler.  She states she continued to get worse throughout the week.  She was seen at a primary care office yesterday because she was not doing any better.  She received an injection of  Rocephin.  No repeat ancillary tests were done.  She was given a cough medication has not helped.  She states she feels very weak and continues to have cough.  She states she is getting worse.  She continues to run fever.  She does have some shortness of breath with exertion.  She states her cough is nonproductive.  She has had intermittent nausea without vomiting.  She has had a decreased appetite has not been eating or drinking over the last few days.  She denies any chest pain.  She states that she has not been swab for COVID or any other viral illness.  She is not for sure what her labs had shown at [facility].     Cardiovascular: Negative.    Gastrointestinal: Negative.    Endocrine: Negative.    Genitourinary: Negative.    Musculoskeletal: Negative.    Skin: Negative.    Allergic/Immunologic: Negative.    Neurological: Negative.    Hematological: Negative.    Psychiatric/Behavioral: Negative.    All other systems reviewed and are negative.      History reviewed. No pertinent past medical history.    No Known Allergies    Past Surgical History:   Procedure Laterality Date   • DENTAL PROCEDURE     • PERITONSILLAR ABSCESS INCISION AND DRAINAGE Bilateral 07/02/2022    Procedure: PERITONSILLAR ABSCESS INCISION AND DRAINAGE;  Surgeon: Rajesh Salinas Jr., MD;  Location: [facility];  Service: ENT;  Laterality: Bilateral;   • TONSILLECTOMY         Family History   Problem Relation Age of Onset   • Lung cancer Maternal Grandfather    • Breast cancer Neg Hx    • Ovarian cancer Neg Hx    • Colon cancer Neg Hx        Social History     Socioeconomic History   • Marital status: Single   Tobacco Use   • Smoking status: Never     Passive exposure: Never   • Smokeless tobacco: Never   Vaping Use   • Vaping Use: Never used   Substance and Sexual Activity   • Alcohol use: No   • Drug use: No   • Sexual activity: Defer     Birth control/protection: OCP       Prior to Admission medications    Medication Sig Start Date  "End Date Taking? Authorizing Provider   dexamethasone (DECADRON) 4 MG tablet Take 1 tablet by mouth 3 (Three) Times a Day. 7/3/22   Rajesh Salinas Jr., MD Chan 1-20 MG-MCG(24) capsule TAKE 1 CAPSULE BY MOUTH DAILY 9/8/21   Ijeoma Ross, APRN       /53 (BP Location: Left arm, Patient Position: Lying)   Pulse 86   Temp 98.1 °F (36.7 °C) (Oral)   Resp 16   Ht 160 cm (63\")   Wt 58.6 kg (129 lb 1.6 oz)   LMP 04/15/2023   SpO2 98%   BMI 22.87 kg/m²     Objective   Physical Exam  Vitals and nursing note reviewed.   Constitutional:       Appearance: She is well-developed.      Comments: Nontoxic-appearing.  No acute distress.  Skin slight pallor   HENT:      Head: Normocephalic and atraumatic.   Eyes:      Conjunctiva/sclera: Conjunctivae normal.      Pupils: Pupils are equal, round, and reactive to light.   Neck:      Thyroid: No thyromegaly.      Trachea: No tracheal deviation.   Cardiovascular:      Rate and Rhythm: Regular rhythm. Tachycardia present.      Heart sounds: Normal heart sounds.      Comments: Sinus tachycardia at 120  Pulmonary:      Effort: Pulmonary effort is normal. No respiratory distress.      Breath sounds: No wheezing or rales.      Comments: Respirations are even and unlabored.  There are some mild expiratory wheezing noted throughout.  Diminished lung sounds bilateral bases  Chest:      Chest wall: No tenderness.   Abdominal:      General: Bowel sounds are normal.      Palpations: Abdomen is soft.   Musculoskeletal:         General: Normal range of motion.      Cervical back: Normal range of motion and neck supple.   Skin:     General: Skin is warm and dry.      Comments: Mild pallor noted   Neurological:      Mental Status: She is alert and oriented to person, place, and time.      Cranial Nerves: No cranial nerve deficit.      Deep Tendon Reflexes: Reflexes are normal and symmetric.   Psychiatric:         Behavior: Behavior normal.         Thought Content: " Thought content normal.         Judgment: Judgment normal.         Procedures         Lab Results (last 24 hours)     Procedure Component Value Units Date/Time    CBC & Differential [[id number]]  (Abnormal) Collected: 04/15/23 0850    Specimen: Blood Updated: 04/15/23 0911    Narrative:      The following orders were created for panel order CBC & Differential.  Procedure                               Abnormality         Status                     ---------                               -----------         ------                     CBC Auto Differential[[id number]]        Abnormal            Final result                 Please view results for these tests on the individual orders.    Comprehensive Metabolic Panel [[id number]]  (Abnormal) Collected: 04/15/23 0850    Specimen: Blood Updated: 04/15/23 0933     Glucose 88 mg/dL      BUN 11 mg/dL      Creatinine 0.80 mg/dL      Sodium 134 mmol/L      Potassium 3.3 mmol/L      Chloride 95 mmol/L      CO2 22.0 mmol/L      Calcium 8.9 mg/dL      Total Protein 7.1 g/dL      Albumin 4.2 g/dL      ALT (SGPT) 12 U/L      AST (SGOT) 20 U/L      Alkaline Phosphatase 71 U/L      Total Bilirubin 0.5 mg/dL      Globulin 2.9 gm/dL      A/G Ratio 1.4 g/dL      BUN/Creatinine Ratio 13.8     Anion Gap 17.0 mmol/L      eGFR 106.3 mL/min/1.73     Narrative:      GFR Normal >60  Chronic Kidney Disease <60  Kidney Failure <15      Blood Culture With NANCY - Blood, Arm, Right [[id number]]  (Normal) Collected: 04/15/23 0850    Specimen: Blood from Arm, Right Updated: 04/15/23 2131     Blood Culture No growth at less than 24 hours    D-dimer, Quantitative [[id number]]  (Abnormal) Collected: 04/15/23 0850    Specimen: Blood Updated: 04/15/23 0923     D-Dimer, Quantitative 1.05 MCGFEU/mL     Narrative:      According to the assay 's published package insert, a normal (<0.50 MCGFEU/mL) D-dimer result in conjunction with a non-high clinical probability assessment, excludes deep vein  "thrombosis (DVT) and pulmonary embolism (PE) with high sensitivity.    D-dimer values increase with age and this can make VTE exclusion of an older population difficult. To address this, the [employer], based on best available evidence and recent guidelines, recommends that clinicians use age-adjusted D-dimer thresholds in patients greater than 50 years of age with: a) a low probability of PE who do not meet all Pulmonary Embolism Rule Out Criteria, or b) in those with intermediate probability of PE.   The formula for an age-adjusted D-dimer cut-off is \"age/100\".  For example, a 60 year old patient would have an age-adjusted cut-off of 0.60 MCGFEU/mL and an 80 year old 0.80 MCGFEU/mL.    Urinalysis With Culture If Indicated - Urine, Clean Catch [[id number]]  (Abnormal) Collected: 04/15/23 0850    Specimen: Urine, Clean Catch Updated: 04/15/23 1448     Color, UA Dark Yellow     Appearance, UA Clear     pH, UA 5.5     Specific Gravity, UA 1.028     Glucose, UA Negative     Ketones, UA 80 mg/dL (3+)     Bilirubin, UA Negative     Blood, UA Moderate (2+)     Protein, UA 30 mg/dL (1+)     Leuk Esterase, UA Negative     Nitrite, UA Negative     Urobilinogen, UA 0.2 E.U./dL    Narrative:      In absence of clinical symptoms, the presence of pyuria, bacteria, and/or nitrites on the urinalysis result does not correlate with infection.    C-reactive Protein [[id number]]  (Abnormal) Collected: 04/15/23 0850    Specimen: Blood Updated: 04/15/23 0933     C-Reactive Protein 15.87 mg/dL     Procalcitonin [[id number]]  (Abnormal) Collected: 04/15/23 0850    Specimen: Blood Updated: 04/15/23 0938     Procalcitonin 0.43 ng/mL     Narrative:      As a Marker for Sepsis (Non-Neonates):    1. <0.5 ng/mL represents a low risk of severe sepsis and/or septic shock.  2. >2 ng/mL represents a high risk of severe sepsis and/or septic shock.    As a Marker for Lower Respiratory Tract Infections that require antibiotic " "therapy:    PCT on Admission    Antibiotic Therapy       6-12 Hrs later    >0.5                Strongly Recommended  >0.25 - <0.5        Recommended   0.1 - 0.25          Discouraged              Remeasure/reassess PCT  <0.1                Strongly Discouraged     Remeasure/reassess PCT    As 28 day mortality risk marker: \"Change in Procalcitonin Result\" (>80% or <=80%) if Day 0 (or Day 1) and Day 4 values are available. Refer to http://www.[facility]-pct-calculator.com    Change in PCT <=80%  A decrease of PCT levels below or equal to 80% defines a positive change in PCT test result representing a higher risk for 28-day all-cause mortality of patients diagnosed with severe sepsis for septic shock.    Change in PCT >80%  A decrease of PCT levels of more than 80% defines a negative change in PCT result representing a lower risk for 28-day all-cause mortality of patients diagnosed with severe sepsis or septic shock.       CBC Auto Differential [[id number]]  (Abnormal) Collected: 04/15/23 0850    Specimen: Blood Updated: 04/15/23 0911     WBC 6.43 10*3/mm3      RBC 4.69 10*6/mm3      Hemoglobin 13.1 g/dL      Hematocrit 40.8 %      MCV 87.0 fL      MCH 27.9 pg      MCHC 32.1 g/dL      RDW 12.7 %      RDW-SD 40.4 fl      MPV 10.7 fL      Platelets 166 10*3/mm3      Neutrophil % 77.9 %      Lymphocyte % 9.6 %      Monocyte % 12.1 %      Eosinophil % 0.0 %      Basophil % 0.2 %      Immature Grans % 0.2 %      Neutrophils, Absolute 5.01 10*3/mm3      Lymphocytes, Absolute 0.62 10*3/mm3      Monocytes, Absolute 0.78 10*3/mm3      Eosinophils, Absolute 0.00 10*3/mm3      Basophils, Absolute 0.01 10*3/mm3      Immature Grans, Absolute 0.01 10*3/mm3      nRBC 0.0 /100 WBC     Urinalysis, Microscopic Only - Urine, Clean Catch [[id number]]  (Abnormal) Collected: 04/15/23 0850    Specimen: Urine, Clean Catch Updated: 04/15/23 0925     RBC, UA None Seen /HPF      WBC, UA 0-2 /HPF      Comment: Urine culture not indicated.        " Bacteria, UA 1+ /HPF      Squamous Epithelial Cells, UA 3-6 /HPF      Hyaline Casts, UA 0-2 /LPF      Mucus, UA Small/1+ /HPF      Methodology Manual Light Microscopy    Blood Culture With NANCY - Blood, Arm, Right [[id number]]  (Normal) Collected: 04/15/23 0855    Specimen: Blood from Arm, Right Updated: 04/15/23 2300     Blood Culture No growth at less than 24 hours    COVID PRE-OP / PRE-PROCEDURE SCREENING ORDER (NO ISOLATION) - Swab, Nasopharynx [[id number]]  (Normal) Collected: 04/15/23 0858    Specimen: Swab from Nasopharynx Updated: 04/15/23 0958    Narrative:      The following orders were created for panel order COVID PRE-OP / PRE-PROCEDURE SCREENING ORDER (NO ISOLATION) - Swab, Nasopharynx.  Procedure                               Abnormality         Status                     ---------                               -----------         ------                     COVID-19, FLU A/B, RSV P...[[id number]]  Normal              Final result                 Please view results for these tests on the individual orders.    COVID-19, FLU A/B, RSV PCR - Swab, Nasopharynx [[id number]]  (Normal) Collected: 04/15/23 0858    Specimen: Swab from Nasopharynx Updated: 04/15/23 0958     COVID19 Not Detected     Influenza A PCR Not Detected     Influenza B PCR Not Detected     RSV, PCR Not Detected    Narrative:      Fact sheet for providers: https://www.fda.gov/media/749226/download    Fact sheet for patients: https://www.fda.gov/media/663996/download    Test performed by PCR.    POC Pregnancy, Urine [[id number]]  (Normal) Collected: 04/15/23 0902    Specimen: Urine Updated: 04/15/23 0903     HCG, Urine, QL Negative     Lot Number [id number]     Internal Positive Control Positive     Internal Negative Control Negative     Expiration Date 4/30/2020    Lactic Acid, Plasma [[id number]]  (Normal) Collected: 04/15/23 1039    Specimen: Blood Updated: 04/15/23 1100     Lactate 0.7 mmol/L     Mycoplasma Pneumoniae PCR - Swab, Larynx  [[id number]] Collected: 04/15/23 1646    Specimen: Swab from Larynx Updated: 04/15/23 1701    MRSA Screen, PCR (Inpatient) - Swab, Nares [[id number]]  (Normal) Collected: 04/15/23 1646    Specimen: Swab from Nares Updated: 04/15/23 1824     MRSA PCR No MRSA Detected    Narrative:      The negative predictive value of this diagnostic test is high and should only be used to consider de-escalating anti-MRSA therapy. A positive result may indicate colonization with MRSA and must be correlated clinically.    Legionella Antigen, Urine - Urine, Urine, Clean Catch [[id number]]  (Normal) Collected: 04/15/23 2118    Specimen: Urine, Clean Catch Updated: 04/15/23 2144     LEGIONELLA ANTIGEN, URINE Negative    S. Pneumo Ag Urine or CSF - Urine, Urine, Clean Catch [[id number]]  (Normal) Collected: 04/15/23 2118    Specimen: Urine, Clean Catch Updated: 04/15/23 2144     Strep Pneumo Ag Negative    Urine Culture - Urine, Urine, Clean Catch [[id number]] Collected: 04/15/23 2118    Specimen: Urine, Clean Catch Updated: 04/15/23 2123    CBC & Differential [[id number]]  (Abnormal) Collected: 04/16/23 0342    Specimen: Blood Updated: 04/16/23 0348    Narrative:      The following orders were created for panel order CBC & Differential.  Procedure                               Abnormality         Status                     ---------                               -----------         ------                     CBC Auto Differential[[id number]]        Abnormal            Final result                 Please view results for these tests on the individual orders.    Comprehensive Metabolic Panel [[id number]]  (Abnormal) Collected: 04/16/23 0342    Specimen: Blood Updated: 04/16/23 0441     Glucose 118 mg/dL      BUN 7 mg/dL      Creatinine 0.47 mg/dL      Sodium 142 mmol/L      Potassium 4.1 mmol/L      Chloride 110 mmol/L      CO2 25.0 mmol/L      Calcium 8.6 mg/dL      Total Protein 6.0 g/dL      Albumin 3.4 g/dL      ALT (SGPT) 11 U/L      AST  (SGOT) 16 U/L      Alkaline Phosphatase 58 U/L      Total Bilirubin 0.2 mg/dL      Globulin 2.6 gm/dL      A/G Ratio 1.3 g/dL      BUN/Creatinine Ratio 14.9     Anion Gap 7.0 mmol/L      eGFR 137.4 mL/min/1.73     Narrative:      GFR Normal >60  Chronic Kidney Disease <60  Kidney Failure <15      CBC Auto Differential [[id number]]  (Abnormal) Collected: 04/16/23 0342    Specimen: Blood Updated: 04/16/23 0348     WBC 6.69 10*3/mm3      RBC 4.37 10*6/mm3      Hemoglobin 12.2 g/dL      Hematocrit 38.0 %      MCV 87.0 fL      MCH 27.9 pg      MCHC 32.1 g/dL      RDW 13.0 %      RDW-SD 41.7 fl      MPV 10.8 fL      Platelets 167 10*3/mm3      Neutrophil % 79.8 %      Lymphocyte % 10.6 %      Monocyte % 9.1 %      Eosinophil % 0.0 %      Basophil % 0.1 %      Immature Grans % 0.4 %      Neutrophils, Absolute 5.33 10*3/mm3      Lymphocytes, Absolute 0.71 10*3/mm3      Monocytes, Absolute 0.61 10*3/mm3      Eosinophils, Absolute 0.00 10*3/mm3      Basophils, Absolute 0.01 10*3/mm3      Immature Grans, Absolute 0.03 10*3/mm3      nRBC 0.0 /100 WBC           CT Angiogram Chest   Final Result   1. 9 cm consolidation in the lateral portion of the right lower lobe   which is likely pneumonia. Reactive adenopathy in the right hilum and   mediastinum.           This report was finalized on 04/15/2023 11:58 by Dr Mauricio Welch, .      XR Chest 2 View   Final Result   1. Right lower lobe pneumonia.   This report was finalized on 04/15/2023 08:46 by Dr. Clarence Riley MD.          ED Course  ED Course as of 04/16/23 0844   Sat Apr 15, 2023   0953 Dimer is elevated at 1.05.  Patient has right lower lobe pneumonia on her chest x-ray.  She continues to be tachycardic at 115.  I will do a CT a of the chest to rule out pulmonary embolus at this time.  Will order blood cultures and order Rocephin and Zithromax as well. [CW]   1240 CTA of the chest is negative for pulmonary embolism.  Patient does have consolidated pneumonia.  She has  had azithromycin and Rocephin while in the emergency department.  Her heart rate is improved at 100 at this time.  Blood pressure is 98/60.  We will give another liter of lactated Ringer's.  Call is placed to hospitalist at this time for admission.  Reviewed the findings with the patient and her family.  They are in agreement with care plan. [CW]   1250 Spoke with Dr Bustos- hospitalist on call. Has accepted for admission and advised to admit to Dr. Nash [CW]      ED Course User Index  [CW] Mickie Farrar APRN        Medical Decision Making  Patient is a 22-year-old white female presents to the emergency department with cough for the past 6 days.  Patient was seen at [facility] 6 days ago with abdominal pain and had a CAT scan of her abdomen pelvis and advised that she had pneumonia.  She states she had a mild cough then.  She states she was started on Levaquin and albuterol inhaler.  She states she continued to get worse throughout the week.  She was seen at a primary care office yesterday because she was not doing any better.  She received an injection of Rocephin.  No repeat ancillary tests were done.  She was given a cough medication has not helped.  She states she feels very weak and continues to have cough.  She states she is getting worse.  She continues to run fever.  She does have some shortness of breath with exertion.  She states her cough is nonproductive.  She has had intermittent nausea without vomiting.  She has had a decreased appetite has not been eating or drinking over the last few days.  She denies any chest pain.  She states that she has not been swab for COVID or any other viral illness.  She is not for sure what her labs had shown at [facility].  Course of treatment in the er: CT Angiogram Chest   Final Result    1. 9 cm consolidation in the lateral portion of the right lower lobe    which is likely pneumonia. Reactive adenopathy in the right hilum  "and    mediastinum.              This report was finalized on 04/15/2023 11:58 by Dr Mauricio Welch, .     XR Chest 2 View   Final Result    1. Right lower lobe pneumonia.    This report was finalized on 04/15/2023 08:46 by Dr. Clarence Riley MD.     Labs Reviewed  COMPREHENSIVE METABOLIC PANEL - Abnormal; Notable for the following components:     Sodium                        134 (*)                Potassium                     3.3 (*)                Chloride                      95 (*)                 Anion Gap                     17.0 (*)            All other components within normal limits         Narrative: GFR Normal >60                  Chronic Kidney Disease <60                  Kidney Failure <15                    D-DIMER, QUANTITATIVE - Abnormal; Notable for the following components:     D-Dimer, Quantitative         1.05 (*)            All other components within normal limits         Narrative: According to the assay 's published package insert, a normal (<0.50 MCGFEU/mL) D-dimer result in conjunction with a non-high clinical probability assessment, excludes deep vein thrombosis (DVT) and pulmonary embolism (PE) with high sensitivity.                                    D-dimer values increase with age and this can make VTE exclusion of an older population difficult. To address this, the [employer], based on best available evidence and recent guidelines, recommends that clinicians use age-adjusted D-dimer thresholds in patients greater than 50 years of age with: a) a low probability of PE who do not meet all Pulmonary Embolism Rule Out Criteria, or b) in those with intermediate probability of PE.                   The formula for an age-adjusted D-dimer cut-off is \"age/100\".                  For example, a 60 year old patient would have an age-adjusted cut-off of 0.60 MCGFEU/mL and an 80 year old 0.80 MCGFEU/mL.  URINALYSIS W/ CULTURE IF INDICATED - Abnormal; Notable " "for the following components:     Color, UA                     Dark Yellow (*)               Ketones, UA                     (*)                  Blood, UA                       (*)                  Protein, UA                     (*)               All other components within normal limits         Narrative: In absence of clinical symptoms, the presence of pyuria, bacteria, and/or nitrites on the urinalysis result does not correlate with infection.  C-REACTIVE PROTEIN - Abnormal; Notable for the following components:     C-Reactive Protein            15.87 (*)            All other components within normal limits  PROCALCITONIN - Abnormal; Notable for the following components:     Procalcitonin                 0.43 (*)            All other components within normal limits         Narrative: As a Marker for Sepsis (Non-Neonates):                                    1. <0.5 ng/mL represents a low risk of severe sepsis and/or septic shock.                  2. >2 ng/mL represents a high risk of severe sepsis and/or septic shock.                                    As a Marker for Lower Respiratory Tract Infections that require antibiotic therapy:                                    PCT on Admission    Antibiotic Therapy       6-12 Hrs later                                    >0.5                Strongly Recommended                  >0.25 - <0.5        Recommended                   0.1 - 0.25          Discouraged              Remeasure/reassess PCT                  <0.1                Strongly Discouraged     Remeasure/reassess PCT                                    As 28 day mortality risk marker: \"Change in Procalcitonin Result\" (>80% or <=80%) if Day 0 (or Day 1) and Day 4 values are available. Refer to http://www.[facility]-pct-calculator.com                                    Change in PCT <=80%                  A decrease of PCT levels below or equal to 80% defines a positive change in PCT test result representing a higher " risk for 28-day all-cause mortality of patients diagnosed with severe sepsis for septic shock.                                    Change in PCT >80%                  A decrease of PCT levels of more than 80% defines a negative change in PCT result representing a lower risk for 28-day all-cause mortality of patients diagnosed with severe sepsis or septic shock.                     CBC WITH AUTO DIFFERENTIAL - Abnormal; Notable for the following components:     Neutrophil %                  77.9 (*)               Lymphocyte %                  9.6 (*)                Monocyte %                    12.1 (*)               Eosinophil %                  0.0 (*)                Lymphocytes, Absolute         0.62 (*)            All other components within normal limits  URINALYSIS, MICROSCOPIC ONLY - Abnormal; Notable for the following components:     WBC, UA                       0-2 (*)                Bacteria, UA                  1+ (*)                 Squamous Epithelial Cells, UA   3-6 (*)                Mucus, UA                     Small/1+ (*)            All other components within normal limits  COMPREHENSIVE METABOLIC PANEL - Abnormal; Notable for the following components:     Glucose                       118 (*)                Creatinine                    0.47 (*)               Chloride                      110 (*)                Albumin                       3.4 (*)             All other components within normal limits         Narrative: GFR Normal >60                  Chronic Kidney Disease <60                  Kidney Failure <15                    CBC WITH AUTO DIFFERENTIAL - Abnormal; Notable for the following components:     Neutrophil %                  79.8 (*)               Lymphocyte %                  10.6 (*)               Eosinophil %                  0.0 (*)             All other components within normal limits  BLOOD CULTURE WITH NANCY - Normal  COVID-19/FLUA&B/RSV, NP SWAB IN TRANSPORT MEDIA 8-12 HR  TAT - Normal         Narrative: Fact sheet for providers: https://www.fda.gov/media/911310/download                    Fact sheet for patients: https://www.fda.gov/media/444070/download                                    Test performed by PCR.  BLOOD CULTURE WITH NANCY - Normal  LEGIONELLA ANTIGEN, URINE - Normal  MRSA SCREEN, PCR - Normal         Narrative: The negative predictive value of this diagnostic test is high and should only be used to consider de-escalating anti-MRSA therapy. A positive result may indicate colonization with MRSA and must be correlated clinically.  STREP PNEUMO AG, URINE OR CSF - Normal  LACTIC ACID, PLASMA - Normal  POCT PEFORM URINE PREGNANCY - Normal  COVID PRE-OP / PRE-PROCEDURE SCREENING ORDER (NO ISOLATION)         Narrative: The following orders were created for panel order COVID PRE-OP / PRE-PROCEDURE SCREENING ORDER (NO ISOLATION) - Swab, Nasopharynx.                  Procedure                               Abnormality         Status                                     ---------                               -----------         ------                                     COVID-19, FLU A/B, RSV P...([id number])  Normal              Final result                                                 Please view results for these tests on the individual orders.  RESPIRATORY CULTURE  MYCOPLASMA PNEUMONIAE PCR  RESPIRATORY PANEL PCR W/ COVID-19 (SARS-COV-2) [facility]/D[facility]O/PAD/[facility]/MAD/YONG IN-HOUSE, NP SWAB IN [facility], 3-4 HR TAT  URINE CULTURE  CBC AND DIFFERENTIAL         Narrative: The following orders were created for panel order CBC & Differential.                  Procedure                               Abnormality         Status                                     ---------                               -----------         ------                                     CBC Auto Differential([id number])        Abnormal            Final result                                                 Please  view results for these tests on the individual orders.  CBC AND DIFFERENTIAL  Patient presents emergency department with cough and fever for the past week.  She was seen at [facility] a week ago for abdominal pain and was noted to have pneumonia on her CAT scan of her abdomen pelvis.  She states she was placed on Levaquin.  She been on Levaquin for the past 6 days.  She was seen at her primary care doctor's office yesterday and was given an injection of Rocephin.  She states she is feeling worse and continues to run fever.  She states her abdominal pain has completely resolved.  Chest x-ray reviewed revealed pneumonia.  Her dimer was elevated therefore CTA of the chest was done which was negative for pulmonary embolus however she did have a consolidated pneumonia in the right lower lobe.  She was given Rocephin and Zithromax while in the emergency department.  She was tachycardic initially with a heart rate of 120 and was given 2 L of lactated Ringer's as well.  She has failed outpatient therapy therefore she was admitted to the hospitalist services with diagnosis of pneumonia of the right lung.  Her vital signs upon admission blood pressure was 100/53, temp 98.1, heart rate 86, respirations 16, O2 sats 98% on room air.  She was feeling better after treatment emergency department.  She was also ordered Solu-Medrol and DuoNeb treatment as well.  Patient was admitted to hospitalist services  Differential diagnosis: Pulmonary embolus, pneumonia, bronchitis, COVID    Failure of outpatient treatment: acute illness or injury  Pneumonia of right lung due to infectious organism, unspecified part of lung: acute illness or injury  Amount and/or Complexity of Data Reviewed  Labs: ordered. Decision-making details documented in ED Course.  Radiology: ordered. Decision-making details documented in ED Course.      Risk  Prescription drug management.  Decision regarding hospitalization.           Final  diagnoses:   Pneumonia of right lung due to infectious organism, unspecified part of lung   Failure of outpatient treatment          Mickie Farrar, APRN  04/16/23 0868

## 2023-04-16 ENCOUNTER — APPOINTMENT (OUTPATIENT)
Dept: GENERAL RADIOLOGY | Facility: HOSPITAL | Age: 23
End: 2023-04-16
Payer: COMMERCIAL

## 2023-04-16 LAB
ALBUMIN SERPL-MCNC: 3.4 G/DL (ref 3.5–5.2)
ALBUMIN/GLOB SERPL: 1.3 G/DL
ALP SERPL-CCNC: 58 U/L (ref 39–117)
ALT SERPL W P-5'-P-CCNC: 11 U/L (ref 1–33)
ANION GAP SERPL CALCULATED.3IONS-SCNC: 7 MMOL/L (ref 5–15)
AST SERPL-CCNC: 16 U/L (ref 1–32)
B PARAPERT DNA SPEC QL NAA+PROBE: NOT DETECTED
B PERT DNA SPEC QL NAA+PROBE: NOT DETECTED
BACTERIA SPEC AEROBE CULT: NO GROWTH
BASOPHILS # BLD AUTO: 0.01 10*3/MM3 (ref 0–0.2)
BASOPHILS NFR BLD AUTO: 0.1 % (ref 0–1.5)
BILIRUB SERPL-MCNC: 0.2 MG/DL (ref 0–1.2)
BUN SERPL-MCNC: 7 MG/DL (ref 6–20)
BUN/CREAT SERPL: 14.9 (ref 7–25)
C PNEUM DNA NPH QL NAA+NON-PROBE: NOT DETECTED
CALCIUM SPEC-SCNC: 8.6 MG/DL (ref 8.6–10.5)
CHLORIDE SERPL-SCNC: 110 MMOL/L (ref 98–107)
CO2 SERPL-SCNC: 25 MMOL/L (ref 22–29)
CREAT SERPL-MCNC: 0.47 MG/DL (ref 0.57–1)
DEPRECATED RDW RBC AUTO: 41.7 FL (ref 37–54)
EGFRCR SERPLBLD CKD-EPI 2021: 137.4 ML/MIN/1.73
EOSINOPHIL # BLD AUTO: 0 10*3/MM3 (ref 0–0.4)
EOSINOPHIL NFR BLD AUTO: 0 % (ref 0.3–6.2)
ERYTHROCYTE [DISTWIDTH] IN BLOOD BY AUTOMATED COUNT: 13 % (ref 12.3–15.4)
FLUAV SUBTYP SPEC NAA+PROBE: NOT DETECTED
FLUBV RNA ISLT QL NAA+PROBE: NOT DETECTED
GLOBULIN UR ELPH-MCNC: 2.6 GM/DL
GLUCOSE SERPL-MCNC: 118 MG/DL (ref 65–99)
HADV DNA SPEC NAA+PROBE: DETECTED
HCOV 229E RNA SPEC QL NAA+PROBE: NOT DETECTED
HCOV HKU1 RNA SPEC QL NAA+PROBE: NOT DETECTED
HCOV NL63 RNA SPEC QL NAA+PROBE: NOT DETECTED
HCOV OC43 RNA SPEC QL NAA+PROBE: NOT DETECTED
HCT VFR BLD AUTO: 38 % (ref 34–46.6)
HGB BLD-MCNC: 12.2 G/DL (ref 12–15.9)
HMPV RNA NPH QL NAA+NON-PROBE: DETECTED
HPIV1 RNA ISLT QL NAA+PROBE: NOT DETECTED
HPIV2 RNA SPEC QL NAA+PROBE: NOT DETECTED
HPIV3 RNA NPH QL NAA+PROBE: NOT DETECTED
HPIV4 P GENE NPH QL NAA+PROBE: NOT DETECTED
IMM GRANULOCYTES # BLD AUTO: 0.03 10*3/MM3 (ref 0–0.05)
IMM GRANULOCYTES NFR BLD AUTO: 0.4 % (ref 0–0.5)
LYMPHOCYTES # BLD AUTO: 0.71 10*3/MM3 (ref 0.7–3.1)
LYMPHOCYTES NFR BLD AUTO: 10.6 % (ref 19.6–45.3)
M PNEUMO IGG SER IA-ACNC: NOT DETECTED
MCH RBC QN AUTO: 27.9 PG (ref 26.6–33)
MCHC RBC AUTO-ENTMCNC: 32.1 G/DL (ref 31.5–35.7)
MCV RBC AUTO: 87 FL (ref 79–97)
MONOCYTES # BLD AUTO: 0.61 10*3/MM3 (ref 0.1–0.9)
MONOCYTES NFR BLD AUTO: 9.1 % (ref 5–12)
NEUTROPHILS NFR BLD AUTO: 5.33 10*3/MM3 (ref 1.7–7)
NEUTROPHILS NFR BLD AUTO: 79.8 % (ref 42.7–76)
NRBC BLD AUTO-RTO: 0 /100 WBC (ref 0–0.2)
PLATELET # BLD AUTO: 167 10*3/MM3 (ref 140–450)
PMV BLD AUTO: 10.8 FL (ref 6–12)
POTASSIUM SERPL-SCNC: 4.1 MMOL/L (ref 3.5–5.2)
PROCALCITONIN SERPL-MCNC: 0.33 NG/ML (ref 0–0.25)
PROT SERPL-MCNC: 6 G/DL (ref 6–8.5)
RBC # BLD AUTO: 4.37 10*6/MM3 (ref 3.77–5.28)
RHINOVIRUS RNA SPEC NAA+PROBE: NOT DETECTED
RSV RNA NPH QL NAA+NON-PROBE: NOT DETECTED
SARS-COV-2 RNA NPH QL NAA+NON-PROBE: NOT DETECTED
SODIUM SERPL-SCNC: 142 MMOL/L (ref 136–145)
WBC NRBC COR # BLD: 6.69 10*3/MM3 (ref 3.4–10.8)

## 2023-04-16 PROCEDURE — 85025 COMPLETE CBC W/AUTO DIFF WBC: CPT | Performed by: FAMILY MEDICINE

## 2023-04-16 PROCEDURE — 94799 UNLISTED PULMONARY SVC/PX: CPT

## 2023-04-16 PROCEDURE — 99222 1ST HOSP IP/OBS MODERATE 55: CPT | Performed by: INTERNAL MEDICINE

## 2023-04-16 PROCEDURE — 87205 SMEAR GRAM STAIN: CPT | Performed by: FAMILY MEDICINE

## 2023-04-16 PROCEDURE — 25010000002 AZITHROMYCIN PER 500 MG: Performed by: FAMILY MEDICINE

## 2023-04-16 PROCEDURE — 94761 N-INVAS EAR/PLS OXIMETRY MLT: CPT

## 2023-04-16 PROCEDURE — 80053 COMPREHEN METABOLIC PANEL: CPT | Performed by: FAMILY MEDICINE

## 2023-04-16 PROCEDURE — 71046 X-RAY EXAM CHEST 2 VIEWS: CPT

## 2023-04-16 PROCEDURE — 94664 DEMO&/EVAL PT USE INHALER: CPT

## 2023-04-16 PROCEDURE — 84145 PROCALCITONIN (PCT): CPT | Performed by: FAMILY MEDICINE

## 2023-04-16 PROCEDURE — 25010000002 CEFTRIAXONE PER 250 MG: Performed by: FAMILY MEDICINE

## 2023-04-16 PROCEDURE — 0202U NFCT DS 22 TRGT SARS-COV-2: CPT | Performed by: FAMILY MEDICINE

## 2023-04-16 PROCEDURE — 87070 CULTURE OTHR SPECIMN AEROBIC: CPT | Performed by: FAMILY MEDICINE

## 2023-04-16 RX ORDER — LOPERAMIDE HYDROCHLORIDE 2 MG/1
2 CAPSULE ORAL 4 TIMES DAILY PRN
Status: DISCONTINUED | OUTPATIENT
Start: 2023-04-16 | End: 2023-04-19 | Stop reason: HOSPADM

## 2023-04-16 RX ORDER — ALBUTEROL SULFATE 2.5 MG/3ML
1.25 SOLUTION RESPIRATORY (INHALATION) EVERY 6 HOURS PRN
Status: DISCONTINUED | OUTPATIENT
Start: 2023-04-16 | End: 2023-04-17 | Stop reason: SDUPTHER

## 2023-04-16 RX ORDER — ALBUTEROL SULFATE 90 UG/1
1-2 AEROSOL, METERED RESPIRATORY (INHALATION) EVERY 4 HOURS PRN
COMMUNITY

## 2023-04-16 RX ORDER — FLUTICASONE PROPIONATE 50 MCG
2 SPRAY, SUSPENSION (ML) NASAL DAILY
Status: DISCONTINUED | OUTPATIENT
Start: 2023-04-16 | End: 2023-04-19 | Stop reason: HOSPADM

## 2023-04-16 RX ORDER — ACETAMINOPHEN 325 MG/1
650 TABLET ORAL EVERY 6 HOURS PRN
Status: DISCONTINUED | OUTPATIENT
Start: 2023-04-16 | End: 2023-04-19 | Stop reason: HOSPADM

## 2023-04-16 RX ORDER — DEXTROMETHORPHAN POLISTIREX 30 MG/5ML
60 SUSPENSION ORAL EVERY 12 HOURS SCHEDULED
Status: DISCONTINUED | OUTPATIENT
Start: 2023-04-16 | End: 2023-04-19 | Stop reason: HOSPADM

## 2023-04-16 RX ORDER — LEVOFLOXACIN 500 MG/1
500 TABLET, FILM COATED ORAL DAILY
COMMUNITY
End: 2023-04-19 | Stop reason: HOSPADM

## 2023-04-16 RX ORDER — CETIRIZINE HYDROCHLORIDE 10 MG/1
10 TABLET ORAL DAILY
Status: DISCONTINUED | OUTPATIENT
Start: 2023-04-16 | End: 2023-04-19 | Stop reason: HOSPADM

## 2023-04-16 RX ORDER — DOXYCYCLINE HYCLATE 100 MG/1
100 CAPSULE ORAL 2 TIMES DAILY
COMMUNITY
End: 2023-04-19 | Stop reason: HOSPADM

## 2023-04-16 RX ORDER — FLUTICASONE PROPIONATE 50 MCG
2 SPRAY, SUSPENSION (ML) NASAL DAILY
Status: DISCONTINUED | OUTPATIENT
Start: 2023-04-16 | End: 2023-04-16 | Stop reason: SDUPTHER

## 2023-04-16 RX ADMIN — SODIUM CHLORIDE, POTASSIUM CHLORIDE, SODIUM LACTATE AND CALCIUM CHLORIDE 125 ML/HR: 600; 310; 30; 20 INJECTION, SOLUTION INTRAVENOUS at 19:53

## 2023-04-16 RX ADMIN — ACETAMINOPHEN 650 MG: 325 TABLET ORAL at 21:58

## 2023-04-16 RX ADMIN — BENZONATATE 200 MG: 100 CAPSULE ORAL at 12:46

## 2023-04-16 RX ADMIN — DEXTROMETHORPHAN POLISTIREX 60 MG: 30 SUSPENSION ORAL at 19:53

## 2023-04-16 RX ADMIN — IPRATROPIUM BROMIDE 0.5 MG: 0.5 SOLUTION RESPIRATORY (INHALATION) at 15:10

## 2023-04-16 RX ADMIN — SODIUM CHLORIDE, POTASSIUM CHLORIDE, SODIUM LACTATE AND CALCIUM CHLORIDE 125 ML/HR: 600; 310; 30; 20 INJECTION, SOLUTION INTRAVENOUS at 11:04

## 2023-04-16 RX ADMIN — ALBUTEROL SULFATE 1.25 MG: 2.5 SOLUTION RESPIRATORY (INHALATION) at 21:10

## 2023-04-16 RX ADMIN — ACETAMINOPHEN 650 MG: 325 TABLET ORAL at 14:41

## 2023-04-16 RX ADMIN — FLUTICASONE PROPIONATE 2 SPRAY: 50 SPRAY, METERED NASAL at 15:35

## 2023-04-16 RX ADMIN — ALBUTEROL SULFATE 1.25 MG: 2.5 SOLUTION RESPIRATORY (INHALATION) at 06:11

## 2023-04-16 RX ADMIN — LOPERAMIDE HYDROCHLORIDE 2 MG: 2 CAPSULE ORAL at 14:41

## 2023-04-16 RX ADMIN — SODIUM CHLORIDE, POTASSIUM CHLORIDE, SODIUM LACTATE AND CALCIUM CHLORIDE 125 ML/HR: 600; 310; 30; 20 INJECTION, SOLUTION INTRAVENOUS at 00:31

## 2023-04-16 RX ADMIN — CEFTRIAXONE 1 G: 1 INJECTION, POWDER, FOR SOLUTION INTRAMUSCULAR; INTRAVENOUS at 14:42

## 2023-04-16 RX ADMIN — IPRATROPIUM BROMIDE 0.5 MG: 0.5 SOLUTION RESPIRATORY (INHALATION) at 06:11

## 2023-04-16 RX ADMIN — LOPERAMIDE HYDROCHLORIDE 2 MG: 2 CAPSULE ORAL at 21:37

## 2023-04-16 RX ADMIN — AZITHROMYCIN 500 MG: 500 INJECTION, POWDER, LYOPHILIZED, FOR SOLUTION INTRAVENOUS at 00:28

## 2023-04-16 RX ADMIN — Medication 10 ML: at 11:01

## 2023-04-16 RX ADMIN — CETIRIZINE HYDROCHLORIDE 10 MG: 10 TABLET ORAL at 15:34

## 2023-04-16 RX ADMIN — IPRATROPIUM BROMIDE 0.5 MG: 0.5 SOLUTION RESPIRATORY (INHALATION) at 11:15

## 2023-04-16 RX ADMIN — ALBUTEROL SULFATE 1.25 MG: 2.5 SOLUTION RESPIRATORY (INHALATION) at 15:10

## 2023-04-16 RX ADMIN — ALBUTEROL SULFATE 1.25 MG: 2.5 SOLUTION RESPIRATORY (INHALATION) at 11:15

## 2023-04-16 NOTE — PLAN OF CARE
Goal Outcome Evaluation:  Plan of Care Reviewed With: patient        Progress: improving    Update in notes.

## 2023-04-16 NOTE — CONSULTS
[facility] PULMONARY & CRITICAL CARE CONSULT - [facility]    23, 16:42 CDT  Patient Care Team:  Sam Smith MD as PCP - General (Family Medicine)  Name: Brittney Stevens  : 2000  MRN: [id number]  Contact Serial Number [id number]    Chief complaint: Shortness of breath, right lower lobe pneumonia, history of infectious mononucleosis, possible asthma, allergic rhinitis, anxiety    HPI:  We have been consulted by Tate Nash MD to see this 22 y.o. female.  Patient is a pleasant young  female who was seen in the pulmonary consult in medical surgical floor.    Her father was present in the room at the time of my visit.  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  She is originally from [place] but currently lives in [place] and does not have any primary care provider because she does not have insurance here.  She reported having infectious mononucleosis last year and needed hospitalizations for 2 weeks.  She is non-smoker and apparently did not have any history of any respiratory illness although she had some shortness of breath chest congestion cough and sinus problems for many years.  She had no family history of asthma.  Her mother  in  from pancreatic cancer.  Her father is also a non-smoker and did not have any history of asthma.    Patient presented to the [facility] in [place] on 2023 with upper respiratory tract infection chest congestion cough and feeling tired and exhausted.  She was evaluated in the ER and was discharged home on Levaquin and Medrol Dosepak.  Although she took Levaquin she did not  Medrol Dosepak.  Her symptoms did not improve and she went to a urgent care clinic and was given a dose of Rocephin intramuscular and also had cough medications and was later discharged home.  She presented to [facility] and was admitted on the hospitalist team yesterday.  Further work-up  was done.  Her chest x-ray shows hyperinflated lung fields and right lower lobe pneumonia and a CT scan of the chest showed consolidation in the right lower lobe suggesting bacterial pneumonia .  Her COVID screen is negative.  Viral panel has been ordered but has not been done yet.  Her Legionella antigen pneumococcal antigen and mycoplasma and MRSA has been negative.    Further laboratory work-up showed she had normal white cell count elevated C-reactive protein mild elevation of D-dimer and elevated procalcitonin.  Lactate was normal otherwise she had unremarkable lab work.  In further reviewing the history it appears patient has a history of pharyngitis and pharyngeal abscess which was seen and treated by Dr. Rajesh Salinas last year.  Patient had urinalysis positive for blood but negative for UTI.  She is currently getting IV fluids for possible dehydration.  She feels anxious and is tearful at times and told me she feels she is not going to survive.  Patient is currently getting ceftriaxone and azithromycin.  She is afebrile but had some low-grade fever earlier this morning.  She is oxygenating well and still in the room air    Past Medical History:   has no past medical history on file.   has a past surgical history that includes Peritonsillar Abscess Incision and Drainage (Bilateral, 07/02/2022); Tonsillectomy; and Dental surgery.  No Known Allergies  Medications:  albuterol, 1.25 mg, Nebulization, 4x Daily - RT   And  ipratropium, 0.5 mg, Nebulization, 4x Daily - RT  azithromycin, 500 mg, Intravenous, Q24H  cefTRIAXone, 1 g, Intravenous, Q24H  cetirizine, 10 mg, Oral, Daily  dextromethorphan polistirex ER, 60 mg, Oral, Q12H  fluticasone, 2 spray, Each Nare, Daily  sodium chloride, 10 mL, Intravenous, Q12H      lactated ringers, 125 mL/hr, Last Rate: 125 mL/hr (04/16/23 1104)      Family History:  Family History   Problem Relation Age of Onset   • Lung cancer Maternal Grandfather    • Breast cancer Neg Hx    •  Ovarian cancer Neg Hx    • Colon cancer Neg Hx      Social History:   reports that she has never smoked. She has never been exposed to tobacco smoke. She has never used smokeless tobacco. She reports that she does not drink alcohol and does not use drugs.  Review of Systems:  Review of Systems   Constitutional: Positive for chills, diaphoresis, fatigue and fever.   HENT: Positive for congestion, postnasal drip, sinus pressure and sinus pain.    Eyes: Positive for discharge and redness.   Respiratory: Positive for cough, chest tightness and shortness of breath.    Cardiovascular: Negative for chest pain and leg swelling.   Gastrointestinal: Negative.    Endocrine: Negative.    Genitourinary: Negative.    Musculoskeletal: Negative.    Skin: Negative.    Allergic/Immunologic: Positive for environmental allergies.   Neurological: Negative.    Hematological: Negative.    Psychiatric/Behavioral: The patient is nervous/anxious.       Physical Exam:  Temp:  [97.5 °F (36.4 °C)-99 °F (37.2 °C)] 99 °F (37.2 °C)  Heart Rate:  [] 112  Resp:  [16] 16  BP: ()/(49-61) 101/51No intake or output data in the 24 hours ending 04/16/23 1642      04/15/23  0733 04/15/23  1600   Weight: 54.4 kg (120 lb) 58.6 kg (129 lb 1.6 oz)     SpO2 Percentage    04/16/23 1510 04/16/23 1515 04/16/23 1622   SpO2: 95% 99% 94%     Body mass index is 22.87 kg/m².   Physical Exam  Vitals and nursing note reviewed.   Constitutional:       General: She is not in acute distress.     Appearance: Normal appearance. She is ill-appearing. She is not diaphoretic.      Comments: Young  female in no acute distress but looks anxious.  She is also flushed and warm   HENT:      Head: Normocephalic and atraumatic.      Right Ear: Tympanic membrane normal. There is no impacted cerumen.      Left Ear: Tympanic membrane normal. There is no impacted cerumen.      Nose: Congestion present. No rhinorrhea.      Mouth/Throat:      Pharynx: No oropharyngeal  exudate or posterior oropharyngeal erythema.   Eyes:      General: No scleral icterus.        Right eye: No discharge.         Left eye: No discharge.      Extraocular Movements: Extraocular movements intact.      Pupils: Pupils are equal, round, and reactive to light.   Neck:      Vascular: No carotid bruit.   Cardiovascular:      Rate and Rhythm: Regular rhythm. Tachycardia present.      Pulses: Normal pulses.      Heart sounds: Normal heart sounds. No murmur heard.    No friction rub. No gallop.   Pulmonary:      Effort: Pulmonary effort is normal. No respiratory distress.      Breath sounds: No stridor. Wheezing and rales present.   Chest:      Chest wall: No tenderness.   Abdominal:      General: Abdomen is flat. Bowel sounds are normal. There is no distension.      Palpations: Abdomen is soft. There is no mass.      Tenderness: There is no abdominal tenderness. There is no guarding or rebound.   Genitourinary:     Comments: Not examined  Musculoskeletal:         General: No swelling, tenderness or deformity. Normal range of motion.      Cervical back: Normal range of motion and neck supple. No rigidity or tenderness.      Right lower leg: No edema.      Left lower leg: No edema.   Skin:     General: Skin is warm and dry.      Capillary Refill: Capillary refill takes less than 2 seconds.      Coloration: Skin is not jaundiced or pale.      Findings: No bruising, erythema, lesion or rash.      Comments: She appears little flushed   Neurological:      General: No focal deficit present.      Mental Status: She is alert and oriented to person, place, and time. Mental status is at baseline.      Cranial Nerves: No cranial nerve deficit.      Sensory: No sensory deficit.      Motor: No weakness.      Deep Tendon Reflexes: Reflexes normal.   Psychiatric:         Behavior: Behavior normal.         Thought Content: Thought content normal.      Comments: Appears anxious and tearful at times.       Result Review  Results  from last 7 days   Lab Units 04/16/23  0342 04/15/23  0850   WBC 10*3/mm3 6.69 6.43   HEMOGLOBIN g/dL 12.2 13.1   PLATELETS 10*3/mm3 167 166     Results from last 7 days   Lab Units 04/16/23  0342 04/15/23  0850   SODIUM mmol/L 142 134*   POTASSIUM mmol/L 4.1 3.3*   CO2 mmol/L 25.0 22.0   BUN mg/dL 7 11   CREATININE mg/dL 0.47* 0.80   GLUCOSE mg/dL 118* 88         Microbiology Results (last 10 days)     Procedure Component Value - Date/Time    Legionella Antigen, Urine - Urine, Urine, Clean Catch [[id number]]  (Normal) Collected: 04/15/23 2118    Lab Status: Final result Specimen: Urine, Clean Catch Updated: 04/15/23 2144     LEGIONELLA ANTIGEN, URINE Negative    S. Pneumo Ag Urine or CSF - Urine, Urine, Clean Catch [[id number]]  (Normal) Collected: 04/15/23 2118    Lab Status: Final result Specimen: Urine, Clean Catch Updated: 04/15/23 2144     Strep Pneumo Ag Negative    Urine Culture - Urine, Urine, Clean Catch [[id number]]  (Normal) Collected: 04/15/23 2118    Lab Status: Preliminary result Specimen: Urine, Clean Catch Updated: 04/16/23 1154     Urine Culture No growth    MRSA Screen, PCR (Inpatient) - Swab, Nares [[id number]]  (Normal) Collected: 04/15/23 1646    Lab Status: Final result Specimen: Swab from Nares Updated: 04/15/23 1824     MRSA PCR No MRSA Detected    Narrative:      The negative predictive value of this diagnostic test is high and should only be used to consider de-escalating anti-MRSA therapy. A positive result may indicate colonization with MRSA and must be correlated clinically.    COVID PRE-OP / PRE-PROCEDURE SCREENING ORDER (NO ISOLATION) - Swab, Nasopharynx [[id number]]  (Normal) Collected: 04/15/23 0858    Lab Status: Final result Specimen: Swab from Nasopharynx Updated: 04/15/23 0958    Narrative:      The following orders were created for panel order COVID PRE-OP / PRE-PROCEDURE SCREENING ORDER (NO ISOLATION) - Swab, Nasopharynx.  Procedure                               Abnormality          Status                     ---------                               -----------         ------                     COVID-19, FLU A/B, RSV P...[[id number]]  Normal              Final result                 Please view results for these tests on the individual orders.    COVID-19, FLU A/B, RSV PCR - Swab, Nasopharynx [[id number]]  (Normal) Collected: 04/15/23 0858    Lab Status: Final result Specimen: Swab from Nasopharynx Updated: 04/15/23 0958     COVID19 Not Detected     Influenza A PCR Not Detected     Influenza B PCR Not Detected     RSV, PCR Not Detected    Narrative:      Fact sheet for providers: https://www.fda.gov/media/263175/download    Fact sheet for patients: https://www.fda.gov/media/712883/download    Test performed by PCR.    Blood Culture With NANCY - Blood, Arm, Right [[id number]]  (Normal) Collected: 04/15/23 0855    Lab Status: Preliminary result Specimen: Blood from Arm, Right Updated: 04/16/23 1100     Blood Culture No growth at 24 hours    Blood Culture With NANCY - Blood, Arm, Right [[id number]]  (Normal) Collected: 04/15/23 0850    Lab Status: Preliminary result Specimen: Blood from Arm, Right Updated: 04/16/23 0930     Blood Culture No growth at 24 hours        Recent radiology:   Imaging Results (Last 72 Hours)     Procedure Component Value Units Date/Time    XR Chest PA & Lateral [[id number]] Collected: 04/16/23 1535     Updated: 04/16/23 1539    Narrative:      EXAMINATION: Chest 2 views 04/16/2023     HISTORY: Pneumonia     FINDINGS: Today's exam is compared to previous study one day earlier.  There is persistent right lower lobe pneumonia showing slight worsening  when compared to the previous exam. Lungs are otherwise clear. No  effusion or free air present.       Impression:      1.. Worsening right lower lobe pneumonia.  This report was finalized on 04/16/2023 15:36 by Dr. Clarence Riley MD.    CT Angiogram Chest [[id number]] Collected: 04/15/23 1151     Updated: 04/15/23 1201    Narrative:       CT ANGIOGRAM CHEST- 4/15/2023 11:38 AM CDT      HISTORY: dyspnea/ tachycardia/ elevated dimer      COMPARISON: Chest x-ray dated 04/15/2023.      DOSE LENGTH PRODUCT: 112 mGy cm. Automated exposure control was also  utilized to decrease patient radiation dose.     TECHNIQUE: Helical tomographic images of the chest were obtained after  the administration of intravenous contrast following angiogram protocol.  Additionally, 3D and multiplanar reformatted images were provided.        FINDINGS:    Pulmonary arteries: There is adequate enhancement of the pulmonary  arteries to evaluate for central and segmental pulmonary emboli. There  are no filling defects within the main, lobar, segmental or visualized  subsegmental pulmonary arteries. The pulmonary arteries are within  normal limits for size.      Aorta and great vessels: Thoracic aorta is normal in caliber. No  dissection identified. No flow-limiting stenosis identified at the great  vessel origins.     Visualized neck base: The imaged portion of the base of the neck appears  unremarkable.      Lungs: 9 cm right lower lobe consolidation, laterally, concerning for  pneumonia. Lungs are otherwise clear. The lungs are well expanded. No  pleural effusion. Airways are clear.     Heart: The heart is normal in size. There is no pericardial effusion.     Mediastinum and lymph nodes: Mild reactive adenopathy in the right hilum  and mediastinum..     Skeletal and soft tissues: Chest wall soft tissues are unremarkable. No  acute bony abnormality. Thoracic spine degenerative change.     Upper abdomen: The imaged portion of the upper abdomen demonstrates no  acute process.        Impression:      1. 9 cm consolidation in the lateral portion of the right lower lobe  which is likely pneumonia. Reactive adenopathy in the right hilum and  mediastinum.        This report was finalized on 04/15/2023 11:58 by Dr Mauricio Welch, .    XR Chest 2 View [[id number]] Collected: 04/15/23  0846     Updated: 04/15/23 0849    Narrative:      EXAMINATION: Chest 2 views 04/15/2023     HISTORY: Cough.     FINDINGS: Two-view exam of the chest demonstrates a right lower lobe  pneumonia with partial silhouetting of the right diaphragm. Lungs are  otherwise clear. No effusion or free air is present.       Impression:      1. Right lower lobe pneumonia.  This report was finalized on 04/15/2023 08:46 by Dr. Clarence Riley MD.        Personal review of imaging: CXR shows Right lower lobe pneumonia hyperinflated lung fields and CT scan shows Right lower lobe pneumonia.  No lymphadenopathy  Other test results (not lab or imaging):  Elevated CRP, procalcitonin, D-dimer CBC and BMP normal except mild hyperglycemia  Independent review of ekg: Heart rate increased sinus tachycardia  Problem List as identified by Epic (may contain historical, inactive problems)    Pneumonia of right lung due to infectious organism, unspecified part of lung    Pulmonary Assessment:    1. Right lower lobe pneumonia  2. Shortness of breath and cough  3. History of infectious mononucleosis  4. History of peritonsillar abscess  5. Mild intermittent asthma  6. Allergic rhinitis  7. Anxiety  8. Sinus tachycardia    Recommend/plan:   · Patient started on Rocephin and Zithromax and which could be continued  · White cell count normal procalcitonin elevated.  Legionella pneumococcal screening negative  · MRSA and mycoplasma negative.  Respiratory viral panel pending  · Patient is getting IV fluid for dehydration.  I will stop albuterol due to tachycardia  · She can continue with Atrovent and I will start albuterol and lower dose or Xopenex nebulizer  · Continue Atrovent.  Encourage incentive spirometry and flutter valve  · From clinical presentation and imaging studies patient has asthma with hyperinflated lung fields with allergy symptom  · I will start the patient on fluticasone nasal spray and Zyrtec.  Plan for outpatient PFT when she is  more stable  · Patient may need further work-up for infectious etiology.  She already had infectious mononucleosis  · We May use oxygen if needed.  Dr. Nash may consider a infectious disease consult  · Monitor labs and imaging studies from time to time.  Physical activity to be encouraged  · Patient is young and active and may not need Lovenox for DVT prophylaxis.  · Pain and anxiety control.  Repeat labs and monitor procalcitonin  · CODE STATUS: Full.  Overall prognosis stable  · We appreciate the consult and we will follow.  · Total time spent in seeing this patient as pulmonary consult was 45 minutes    Thank you for this consult.  We will follow along.    Electronically signed by     Tomasa Gage MD,   Pulmonologist/Intensivist   04/16/23, 4:42 PM CDT.    Interval progress note    Patient respiratory viral panel has been checked and it is positive for metapneumovirus and adenovirus  She is complaining of difficulty breathing of the respiratory secretions.  She was started on MetaNeb and was placed on oxygen.  Her oxygen saturation was in the low 90s.  She is feeling little better after starting oxygen.  Respiratory isolation initiated.  Patient and her father was notified about the positive viral panel.  We will plan to continue current antibiotic coverage and supportive respiratory care and continue following her.  Further work-up for asthma will be done when she is more stable.    Tomasa Gage MD  Pulmonologist/Intensivist  4/16/2023 18:44 CDT

## 2023-04-16 NOTE — PLAN OF CARE
Goal Outcome Evaluation:  Plan of Care Reviewed With: patient        Progress: no change  Outcome Evaluation: VSS. Pt appeared to sleep most of the night. Pt noted to have dry non-productive cough. Pt reported having mild chest pressure that worsens with deep breaths. HR remained sinus/sinus tracie on tele. BP stable. O2 sats stable on room air. No signs of respiratory distress.

## 2023-04-16 NOTE — PROGRESS NOTES
[facility]  INPATIENT PROGRESS NOTE    Patient Name: Brittney Stevens  Date of Admission: 4/15/2023  Today's Date: 04/16/23  Length of Stay: 1  Primary Care Physician: Sam Smith MD    Subjective   Chief Complaint: Pneumonia/coughing    HPI   Patient planing coughing, discussed need to start Tessalon Perle.  Hypotension is improving.  Patient has been afebrile last 24 hours.  Patient stated she is a little better.    Review of Systems   Constitutional: Positive for activity change, chills and fatigue. Negative for appetite change and fever.   HENT: Negative for hearing loss, nosebleeds, tinnitus and trouble swallowing.    Eyes: Negative for visual disturbance.   Respiratory: Negative for cough, chest tightness, shortness of breath and wheezing.    Cardiovascular: Negative for chest pain, palpitations and leg swelling.   Gastrointestinal: Negative for abdominal distention, abdominal pain, blood in stool, constipation, diarrhea, nausea and vomiting.   Endocrine: Negative for cold intolerance, heat intolerance, polydipsia, polyphagia and polyuria.   Genitourinary: Negative for decreased urine volume, difficulty urinating, dysuria, flank pain, frequency and hematuria.   Musculoskeletal: Positive for arthralgias. Negative for joint swelling and myalgias.   Skin: Negative for rash.   Allergic/Immunologic: Negative for immunocompromised state.   Neurological: Positive for weakness. Negative for dizziness, syncope, light-headedness and headaches.   Hematological: Negative for adenopathy. Does not bruise/bleed easily.   Psychiatric/Behavioral: Negative for confusion and sleep disturbance. The patient is not nervous/anxious.       All pertinent negatives and positives are as above. All other systems have been reviewed and are negative unless otherwise stated.     Objective    Temp:  [97.5 °F (36.4 °C)-98.6 °F (37 °C)] 98.1 °F (36.7 °C)  Heart Rate:  []  86  Resp:  [16] 16  BP: ()/(49-68) 100/53  Physical Exam  Vitals and nursing note reviewed.   HENT:      Head: Normocephalic.   Eyes:      Conjunctiva/sclera: Conjunctivae normal.      Pupils: Pupils are equal, round, and reactive to light.   Neck:      Vascular: No JVD.   Cardiovascular:      Rate and Rhythm: Normal rate and regular rhythm.      Heart sounds: Normal heart sounds.   Pulmonary:      Effort: No respiratory distress.      Breath sounds: No wheezing or rales.      Comments: Crackle right lower lung.  Chest:      Chest wall: No tenderness.   Abdominal:      General: Bowel sounds are normal. There is no distension.      Palpations: Abdomen is soft.      Tenderness: There is no abdominal tenderness.   Musculoskeletal:         General: No tenderness or deformity. Normal range of motion.      Cervical back: Neck supple.   Skin:     General: Skin is warm and dry.      Capillary Refill: Capillary refill takes 2 to 3 seconds.      Findings: No rash.   Neurological:      Mental Status: She is alert and oriented to person, place, and time.      Cranial Nerves: No cranial nerve deficit.      Motor: No abnormal muscle tone.      Deep Tendon Reflexes: Reflexes normal.   Psychiatric:         Mood and Affect: Mood normal.         Behavior: Behavior normal.         Thought Content: Thought content normal.         Judgment: Judgment normal.             Results Review:  I have reviewed the labs, radiology results, and diagnostic studies.    Laboratory Data:   Results from last 7 days   Lab Units 04/16/23  0342 04/15/23  0850   WBC 10*3/mm3 6.69 6.43   HEMOGLOBIN g/dL 12.2 13.1   HEMATOCRIT % 38.0 40.8   PLATELETS 10*3/mm3 167 166        Results from last 7 days   Lab Units 04/16/23  0342 04/15/23  0850   SODIUM mmol/L 142 134*   POTASSIUM mmol/L 4.1 3.3*   CHLORIDE mmol/L 110* 95*   CO2 mmol/L 25.0 22.0   BUN mg/dL 7 11   CREATININE mg/dL 0.47* 0.80   CALCIUM mg/dL 8.6 8.9   BILIRUBIN mg/dL 0.2 0.5   ALK PHOS U/L  58 71   ALT (SGPT) U/L 11 12   AST (SGOT) U/L 16 20   GLUCOSE mg/dL 118* 88       Culture Data:   Blood Culture   Date Value Ref Range Status   04/15/2023 No growth at less than 24 hours  Preliminary   04/15/2023 No growth at 24 hours  Preliminary       Radiology Data:   Imaging Results (Last 24 Hours)     Procedure Component Value Units Date/Time    CT Angiogram Chest [[id number]] Collected: 04/15/23 1151     Updated: 04/15/23 1201    Narrative:      CT ANGIOGRAM CHEST- 4/15/2023 11:38 AM CDT      HISTORY: dyspnea/ tachycardia/ elevated dimer      COMPARISON: Chest x-ray dated 04/15/2023.      DOSE LENGTH PRODUCT: 112 mGy cm. Automated exposure control was also  utilized to decrease patient radiation dose.     TECHNIQUE: Helical tomographic images of the chest were obtained after  the administration of intravenous contrast following angiogram protocol.  Additionally, 3D and multiplanar reformatted images were provided.        FINDINGS:    Pulmonary arteries: There is adequate enhancement of the pulmonary  arteries to evaluate for central and segmental pulmonary emboli. There  are no filling defects within the main, lobar, segmental or visualized  subsegmental pulmonary arteries. The pulmonary arteries are within  normal limits for size.      Aorta and great vessels: Thoracic aorta is normal in caliber. No  dissection identified. No flow-limiting stenosis identified at the great  vessel origins.     Visualized neck base: The imaged portion of the base of the neck appears  unremarkable.      Lungs: 9 cm right lower lobe consolidation, laterally, concerning for  pneumonia. Lungs are otherwise clear. The lungs are well expanded. No  pleural effusion. Airways are clear.     Heart: The heart is normal in size. There is no pericardial effusion.     Mediastinum and lymph nodes: Mild reactive adenopathy in the right hilum  and mediastinum..     Skeletal and soft tissues: Chest wall soft tissues are unremarkable. No  acute  bony abnormality. Thoracic spine degenerative change.     Upper abdomen: The imaged portion of the upper abdomen demonstrates no  acute process.        Impression:      1. 9 cm consolidation in the lateral portion of the right lower lobe  which is likely pneumonia. Reactive adenopathy in the right hilum and  mediastinum.        This report was finalized on 04/15/2023 11:58 by Dr Mauricio Welch, .          I have reviewed the patient's current medications.     Assessment/Plan   Assessment  Active Hospital Problems    Diagnosis    • **Pneumonia of right lung due to infectious organism, unspecified part of lung        Treatment Plan  Pneumonia/fever.  Patient has mono 2 months ago. Failed outpatient treatment.  Patient went to [place] [facility] 4/9/2023-received Levaquin, Medrol Dosepak, and cough medication and was sent home.  Patient went to see a clinic yesterday at [facility] was given Rocephin and was sent home.  Patient came to ER today with similar symptoms.  Continue Rocephin and azithromycin.  Pneumonia protocol for now.  Tessalon Perle as needed.  Incentive spirometer.  Chest x-ray-Right lower lobe pneumonia.  CTA of the chest-9 cm consolidation in the lateral portion of the right lower lobe which is likely pneumonia, Reactive adenopathy in the right hilum and mediastinum.  Patient is on room air.     Hypotension.  Improving. IV fluids.     Hyponatremia.  Resolved. Lactated ringer.     Hypokalemia.  Resolved.     Nauseous.  Zofran as needed.     Nutrition.  Regular/house diet.     Blood cultures-no growth less than 24 hours. Legionella antigen-negative.  MRSA screen-negative.  Mycoplasma pneumonia-pending.    Respiratory PCR.  Strep pneumo-negative.  COVID-19-negative.  Flu screen-negative.     Medical Decision Making  Number and Complexity of problems: Pneumonia/fever.  Differential Diagnosis: None.     Conditions and Status      Ongoing treatment.     [facility] Data  External documents reviewed: Previous note  Cardiac tracing  (EKG, telemetry) interpretation: Sinus .  Radiology interpretation: X-ray/CT scan  Labs reviewed: Laboratory  Any tests that were considered but not ordered: Lab in a.m.     Decision rules/scores evaluated (example [id number], Wells, etc): None.     Discussed with: Patient and father     Care Planning  Shared decision making: Patient and father  Code status and discussions: Full code     Disposition  Social Determinants of Health that impact treatment or disposition: From home  Estimated length of stay is 1 to 3 days.        Electronically signed by Tate Nash MD, 04/16/23, 10:41 CDT.

## 2023-04-16 NOTE — NURSING NOTE
Patient has tested positive for Adenovirus, Isolation has been initiated for patient , patient and father have been educated about spread and how to not infect others. 2 ltrs of O2 prescribed by Pulm with humidification. VS have been stable.

## 2023-04-16 NOTE — CASE MANAGEMENT/SOCIAL WORK
Discharge Planning Assessment  [facility]     Patient Name: Brittney Stevens  MRN: [id number]  Today's Date: 4/16/2023    Admit Date: 4/15/2023        Discharge Needs Assessment     Row Name 04/16/23 1408       Living Environment    Current Living Arrangements home    Primary Care Provided by self    Provides Primary Care For no one    Family Caregiver if Needed parent(s)    Quality of Family Relationships helpful    Able to Return to Prior Arrangements yes       Resource/Environmental Concerns    Resource/Environmental Concerns none       Transition Planning    Patient/Family Anticipates Transition to home    Patient/Family Anticipated Services at Transition none       Discharge Needs Assessment    Readmission Within the Last 30 Days no previous admission in last 30 days    Equipment Currently Used at Home none    Concerns to be Addressed denies needs/concerns at this time    Equipment Needed After Discharge none    Discharge Coordination/Progress PT resides at home and was independent prior to admission. PT attends college at [employer]. SW has been consulted due to lack of insurance, but PT has [place] Medicaid on file. SW unable to verify if Medicaid is active on the weekend. Will follow and assist as needed. PT plans to return home at [place] and is unaware of any needs.               Discharge Plan    No documentation.               Continued Care and Services - Admitted Since 4/15/2023    Coordination has not been started for this encounter.          Demographic Summary    No documentation.                Functional Status    No documentation.                Psychosocial    No documentation.                Abuse/Neglect    No documentation.                Legal    No documentation.                Substance Abuse    No documentation.                Patient Forms    No documentation.                   ARMANDO Bragg

## 2023-04-17 LAB
ADV 40+41 DNA STL QL NAA+NON-PROBE: NOT DETECTED
ALBUMIN SERPL-MCNC: 3.5 G/DL (ref 3.5–5.2)
ALBUMIN/GLOB SERPL: 1.5 G/DL
ALP SERPL-CCNC: 54 U/L (ref 39–117)
ALT SERPL W P-5'-P-CCNC: 10 U/L (ref 1–33)
ANION GAP SERPL CALCULATED.3IONS-SCNC: 10 MMOL/L (ref 5–15)
AST SERPL-CCNC: 16 U/L (ref 1–32)
ASTRO TYP 1-8 RNA STL QL NAA+NON-PROBE: NOT DETECTED
BASOPHILS # BLD AUTO: 0.02 10*3/MM3 (ref 0–0.2)
BASOPHILS NFR BLD AUTO: 0.3 % (ref 0–1.5)
BILIRUB SERPL-MCNC: 0.3 MG/DL (ref 0–1.2)
BUN SERPL-MCNC: 3 MG/DL (ref 6–20)
BUN/CREAT SERPL: 4.8 (ref 7–25)
C CAYETANENSIS DNA STL QL NAA+NON-PROBE: NOT DETECTED
C COLI+JEJ+UPSA DNA STL QL NAA+NON-PROBE: NOT DETECTED
CALCIUM SPEC-SCNC: 8.4 MG/DL (ref 8.6–10.5)
CHLORIDE SERPL-SCNC: 102 MMOL/L (ref 98–107)
CO2 SERPL-SCNC: 27 MMOL/L (ref 22–29)
CREAT SERPL-MCNC: 0.63 MG/DL (ref 0.57–1)
CRYPTOSP DNA STL QL NAA+NON-PROBE: NOT DETECTED
DEPRECATED RDW RBC AUTO: 40.9 FL (ref 37–54)
E HISTOLYT DNA STL QL NAA+NON-PROBE: NOT DETECTED
EAEC PAA PLAS AGGR+AATA ST NAA+NON-PRB: NOT DETECTED
EC STX1+STX2 GENES STL QL NAA+NON-PROBE: NOT DETECTED
EGFRCR SERPLBLD CKD-EPI 2021: 128 ML/MIN/1.73
EOSINOPHIL # BLD AUTO: 0 10*3/MM3 (ref 0–0.4)
EOSINOPHIL NFR BLD AUTO: 0 % (ref 0.3–6.2)
EPEC EAE GENE STL QL NAA+NON-PROBE: NOT DETECTED
ERYTHROCYTE [DISTWIDTH] IN BLOOD BY AUTOMATED COUNT: 13 % (ref 12.3–15.4)
ETEC LTA+ST1A+ST1B TOX ST NAA+NON-PROBE: NOT DETECTED
G LAMBLIA DNA STL QL NAA+NON-PROBE: NOT DETECTED
GLOBULIN UR ELPH-MCNC: 2.3 GM/DL
GLUCOSE SERPL-MCNC: 98 MG/DL (ref 65–99)
HCT VFR BLD AUTO: 37.3 % (ref 34–46.6)
HGB BLD-MCNC: 11.8 G/DL (ref 12–15.9)
IMM GRANULOCYTES # BLD AUTO: 0.04 10*3/MM3 (ref 0–0.05)
IMM GRANULOCYTES NFR BLD AUTO: 0.6 % (ref 0–0.5)
LYMPHOCYTES # BLD AUTO: 1.23 10*3/MM3 (ref 0.7–3.1)
LYMPHOCYTES NFR BLD AUTO: 17 % (ref 19.6–45.3)
MCH RBC QN AUTO: 27.6 PG (ref 26.6–33)
MCHC RBC AUTO-ENTMCNC: 31.6 G/DL (ref 31.5–35.7)
MCV RBC AUTO: 87.1 FL (ref 79–97)
MONOCYTES # BLD AUTO: 0.52 10*3/MM3 (ref 0.1–0.9)
MONOCYTES NFR BLD AUTO: 7.2 % (ref 5–12)
NEUTROPHILS NFR BLD AUTO: 5.44 10*3/MM3 (ref 1.7–7)
NEUTROPHILS NFR BLD AUTO: 74.9 % (ref 42.7–76)
NOROVIRUS GI+II RNA STL QL NAA+NON-PROBE: NOT DETECTED
NRBC BLD AUTO-RTO: 0 /100 WBC (ref 0–0.2)
P SHIGELLOIDES DNA STL QL NAA+NON-PROBE: NOT DETECTED
PLATELET # BLD AUTO: 194 10*3/MM3 (ref 140–450)
PMV BLD AUTO: 11.1 FL (ref 6–12)
POTASSIUM SERPL-SCNC: 3.6 MMOL/L (ref 3.5–5.2)
PROT SERPL-MCNC: 5.8 G/DL (ref 6–8.5)
RBC # BLD AUTO: 4.28 10*6/MM3 (ref 3.77–5.28)
RVA RNA STL QL NAA+NON-PROBE: NOT DETECTED
S ENT+BONG DNA STL QL NAA+NON-PROBE: NOT DETECTED
SAPO I+II+IV+V RNA STL QL NAA+NON-PROBE: NOT DETECTED
SHIGELLA SP+EIEC IPAH ST NAA+NON-PROBE: NOT DETECTED
SODIUM SERPL-SCNC: 139 MMOL/L (ref 136–145)
V CHOL+PARA+VUL DNA STL QL NAA+NON-PROBE: NOT DETECTED
V CHOLERAE DNA STL QL NAA+NON-PROBE: NOT DETECTED
WBC NRBC COR # BLD: 7.25 10*3/MM3 (ref 3.4–10.8)
Y ENTEROCOL DNA STL QL NAA+NON-PROBE: NOT DETECTED

## 2023-04-17 PROCEDURE — 94799 UNLISTED PULMONARY SVC/PX: CPT

## 2023-04-17 PROCEDURE — 80053 COMPREHEN METABOLIC PANEL: CPT | Performed by: FAMILY MEDICINE

## 2023-04-17 PROCEDURE — 99233 SBSQ HOSP IP/OBS HIGH 50: CPT | Performed by: INTERNAL MEDICINE

## 2023-04-17 PROCEDURE — 87507 IADNA-DNA/RNA PROBE TQ 12-25: CPT | Performed by: FAMILY MEDICINE

## 2023-04-17 PROCEDURE — 94664 DEMO&/EVAL PT USE INHALER: CPT

## 2023-04-17 PROCEDURE — 85025 COMPLETE CBC W/AUTO DIFF WBC: CPT | Performed by: FAMILY MEDICINE

## 2023-04-17 PROCEDURE — 25010000002 AZITHROMYCIN PER 500 MG: Performed by: FAMILY MEDICINE

## 2023-04-17 PROCEDURE — 25010000002 PIPERACILLIN SOD-TAZOBACTAM PER 1 G: Performed by: FAMILY MEDICINE

## 2023-04-17 RX ORDER — IBUPROFEN 600 MG/1
600 TABLET ORAL EVERY 4 HOURS PRN
Status: DISCONTINUED | OUTPATIENT
Start: 2023-04-17 | End: 2023-04-19 | Stop reason: HOSPADM

## 2023-04-17 RX ORDER — ALBUTEROL SULFATE 2.5 MG/3ML
1.25 SOLUTION RESPIRATORY (INHALATION)
Status: DISCONTINUED | OUTPATIENT
Start: 2023-04-17 | End: 2023-04-17

## 2023-04-17 RX ORDER — ALBUTEROL SULFATE 2.5 MG/3ML
1.25 SOLUTION RESPIRATORY (INHALATION) EVERY 4 HOURS PRN
Status: DISCONTINUED | OUTPATIENT
Start: 2023-04-17 | End: 2023-04-19 | Stop reason: HOSPADM

## 2023-04-17 RX ADMIN — ACETAMINOPHEN 650 MG: 325 TABLET ORAL at 04:43

## 2023-04-17 RX ADMIN — AZITHROMYCIN 500 MG: 500 INJECTION, POWDER, LYOPHILIZED, FOR SOLUTION INTRAVENOUS at 02:22

## 2023-04-17 RX ADMIN — ACETAMINOPHEN 650 MG: 325 TABLET ORAL at 15:37

## 2023-04-17 RX ADMIN — SODIUM CHLORIDE, POTASSIUM CHLORIDE, SODIUM LACTATE AND CALCIUM CHLORIDE 125 ML/HR: 600; 310; 30; 20 INJECTION, SOLUTION INTRAVENOUS at 13:02

## 2023-04-17 RX ADMIN — SODIUM CHLORIDE, POTASSIUM CHLORIDE, SODIUM LACTATE AND CALCIUM CHLORIDE 125 ML/HR: 600; 310; 30; 20 INJECTION, SOLUTION INTRAVENOUS at 05:57

## 2023-04-17 RX ADMIN — DEXTROMETHORPHAN POLISTIREX 60 MG: 30 SUSPENSION ORAL at 20:30

## 2023-04-17 RX ADMIN — TAZOBACTAM SODIUM AND PIPERACILLIN SODIUM 4.5 G: 500; 4 INJECTION, SOLUTION INTRAVENOUS at 17:10

## 2023-04-17 RX ADMIN — CETIRIZINE HYDROCHLORIDE 10 MG: 10 TABLET ORAL at 08:09

## 2023-04-17 RX ADMIN — ALBUTEROL SULFATE 1.25 MG: 2.5 SOLUTION RESPIRATORY (INHALATION) at 07:19

## 2023-04-17 RX ADMIN — ALBUTEROL SULFATE 1.25 MG: 2.5 SOLUTION RESPIRATORY (INHALATION) at 14:29

## 2023-04-17 RX ADMIN — DEXTROMETHORPHAN POLISTIREX 60 MG: 30 SUSPENSION ORAL at 08:09

## 2023-04-17 RX ADMIN — FLUTICASONE PROPIONATE 2 SPRAY: 50 SPRAY, METERED NASAL at 08:09

## 2023-04-17 RX ADMIN — TAZOBACTAM SODIUM AND PIPERACILLIN SODIUM 4.5 G: 500; 4 INJECTION, SOLUTION INTRAVENOUS at 13:02

## 2023-04-17 NOTE — PROGRESS NOTES
[facility]  INPATIENT PROGRESS NOTE    Patient Name: Brittney Stevens  Date of Admission: 4/15/2023  Today's Date: 04/17/23  Length of Stay: 2  Primary Care Physician: Sam Smith MD    Subjective   Chief Complaint: follow up pneumonia  HPI   Doing ok.  Spiked a fever to 103  Still feels bad  She is less SOA        Review of Systems   Constitutional: Positive for fatigue and fever.   HENT: Negative for congestion and ear pain.    Eyes: Negative for pain and visual disturbance.   Respiratory: Positive for cough and shortness of breath. Negative for wheezing.    Cardiovascular: Negative for chest pain and palpitations.   Gastrointestinal: Negative for diarrhea, nausea and vomiting.   Endocrine: Negative for heat intolerance.   Genitourinary: Negative for dysuria and frequency.   Musculoskeletal: Negative for arthralgias and back pain.   Skin: Negative for rash and wound.   Neurological: Negative for dizziness and light-headedness.   Psychiatric/Behavioral: Negative for confusion. The patient is not nervous/anxious.    All other systems reviewed and are negative.         All pertinent negatives and positives are as above. All other systems have been reviewed and are negative unless otherwise stated.     Objective    Temp:  [99.3 °F (37.4 °C)-103.2 °F (39.6 °C)] 99.3 °F (37.4 °C)  Heart Rate:  [] 113  Resp:  [12-20] 13  BP: ()/(53-64) 107/64  Physical Exam  Constitutional:       Appearance: Normal appearance. She is well-developed.   HENT:      Head: Normocephalic and atraumatic.      Right Ear: Tympanic membrane, ear canal and external ear normal.      Left Ear: Tympanic membrane, ear canal and external ear normal.      Nose: Nose normal.      Mouth/Throat:      Mouth: Mucous membranes are moist.      Pharynx: Oropharynx is clear.   Eyes:      Extraocular Movements: Extraocular movements intact.      Conjunctiva/sclera: Conjunctivae normal.       Pupils: Pupils are equal, round, and reactive to light.   Cardiovascular:      Rate and Rhythm: Normal rate and regular rhythm.      Heart sounds: Normal heart sounds.   Pulmonary:      Effort: Pulmonary effort is normal.      Breath sounds: Decreased breath sounds present.   Abdominal:      General: Bowel sounds are normal. There is no distension.      Palpations: Abdomen is soft.      Tenderness: There is no abdominal tenderness.   Musculoskeletal:         General: Normal range of motion.      Cervical back: Normal range of motion and neck supple.   Skin:     General: Skin is warm and dry.   Neurological:      Mental Status: She is alert and oriented to person, place, and time.   Psychiatric:         Mood and Affect: Mood normal.         Speech: Speech normal.         Behavior: Behavior normal.         Thought Content: Thought content normal.         Judgment: Judgment normal.           Results Review:  I have reviewed the labs, radiology results, and diagnostic studies.    Laboratory Data:   Results from last 7 days   Lab Units 04/17/23  0407 04/16/23  0342 04/15/23  0850   WBC 10*3/mm3 7.25 6.69 6.43   HEMOGLOBIN g/dL 11.8* 12.2 13.1   HEMATOCRIT % 37.3 38.0 40.8   PLATELETS 10*3/mm3 194 167 166        Results from last 7 days   Lab Units 04/17/23  0407 04/16/23  0342 04/15/23  0850   SODIUM mmol/L 139 142 134*   POTASSIUM mmol/L 3.6 4.1 3.3*   CHLORIDE mmol/L 102 110* 95*   CO2 mmol/L 27.0 25.0 22.0   BUN mg/dL 3* 7 11   CREATININE mg/dL 0.63 0.47* 0.80   CALCIUM mg/dL 8.4* 8.6 8.9   BILIRUBIN mg/dL 0.3 0.2 0.5   ALK PHOS U/L 54 58 71   ALT (SGPT) U/L 10 11 12   AST (SGOT) U/L 16 16 20   GLUCOSE mg/dL 98 118* 88       Culture Data:   Blood Culture   Date Value Ref Range Status   04/15/2023 No growth at 2 days  Preliminary   04/15/2023 No growth at 2 days  Preliminary     Urine Culture   Date Value Ref Range Status   04/15/2023 No growth  Final       Radiology Data:   Imaging Results (Last 24 Hours)     ** No  results found for the last 24 hours. **          I have reviewed the patient's current medications.     Assessment/Plan   Assessment  Active Hospital Problems    Diagnosis    • **Pneumonia of right lung due to infectious organism, unspecified part of lung        Treatment Plan  1.  Pneumonia  -IV abx  -pulm following    2.  Adenovirus  -supportive care    3.  Human meta pneumovirus  -supportive care      Medical Decision Making  Number and Complexity of problems: 3 problems, moderate complexity    Risk:  Moderate:  Prescription drug management.    Differential Diagnosis: Bacterial Pneumonia, Viral pneumonia    Conditions and Status        Condition is improving.     MDM Data  1:  Tests/Documents/Independent Historians: (3)  A:  Prior external notes from unique source reviewed: n/a  B.  Review of the Results of Each Unique Test: (3) CBC, CMP, Procalcitonin  C.  Assessment requiring an independent Historian:n/a    2.  Independent interpretation of Tests: (1)  A.  Radiology Interpretation: CXR:  RLL Pneumonia  B:  EKG/Telemetry Interpretation: n/a    3:  Discussion of management or Test interpretation  n/a       Care Planning  Shared decision making: patient agreeable to treatment plan  Code status and discussions: full code    Disposition  Social Determinants of Health that impact treatment or disposition: n/a  I expect the patient to be discharged to home in 2-3 days.     Electronically signed by Sam Garcia MD, 04/17/23, 17:19 CDT.

## 2023-04-17 NOTE — PLAN OF CARE
Goal Outcome Evaluation:  Plan of Care Reviewed With: patient        Progress: no change  Outcome Evaluation: Pt awake and alert, dad at BS. Pt denies pain. Reports feeling better this afternoon after fever broke. Cont. IVF. Antibiotics changed to Zoysn today. Continue droplet isolation. GI panel sent to lab. Safety maintained.

## 2023-04-17 NOTE — PROGRESS NOTES
"Pharmacy Dosing Service  Antimicrobial Dosing  Zosyn    Assessment/Action/Plan:  Based on indication and renal function, Zosyn dosed at  4.5 gm IV every 8 hours via extended infusion. Pharmacy will continue to monitor daily and make further adjustment(s) accordingly.     Subjective:  Brittney Stevens is a 22 y.o. female with a  \"Pharmacy to Dose Zosyn\" consult for the treatment of pneumonia , day 1 of 7 of treatment.    Objective:  Ht: 160 cm (63\"); Wt: 58.6 kg (129 lb 1.6 oz)  Estimated Creatinine Clearance: 128.5 mL/min (by C-G formula based on SCr of 0.63 mg/dL).   Creatinine   Date Value Ref Range Status   04/17/2023 0.63 0.57 - 1.00 mg/dL Final   04/16/2023 0.47 (L) 0.57 - 1.00 mg/dL Final   04/15/2023 0.80 0.57 - 1.00 mg/dL Final      Lab Results   Component Value Date    WBC 7.25 04/17/2023    WBC 6.69 04/16/2023    WBC 6.43 04/15/2023      Baseline culture results:  Microbiology Results (last 10 days)       Procedure Component Value - Date/Time    Respiratory Culture - Sputum, Cough [[id number]] Collected: 04/16/23 1956    Lab Status: Preliminary result Specimen: Sputum from Cough Updated: 04/17/23 0617     Gram Stain Moderate (3+) WBCs per low power field      Rare (1+) Epithelial cells per low power field      Few (2+) Gram positive cocci      Few (2+) Gram negative bacilli    Respiratory Panel PCR w/COVID-19(SARS-CoV-2) [facility]/NICK/DOROTEO/PAD/COR/MAD/YONG In-House, NP Swab in UTM/VTM, 3-4 HR TAT - Swab, Nasopharynx [[id number]]  (Abnormal) Collected: 04/16/23 1645    Lab Status: Final result Specimen: Swab from Nasopharynx Updated: 04/16/23 1751     ADENOVIRUS, PCR Detected     Coronavirus 229E Not Detected     Coronavirus HKU1 Not Detected     Coronavirus NL63 Not Detected     Coronavirus OC43 Not Detected     COVID19 Not Detected     Human Metapneumovirus Detected     Human Rhinovirus/Enterovirus Not Detected     Influenza A PCR Not Detected     Influenza B PCR Not Detected     Parainfluenza Virus 1 Not Detected "     Parainfluenza Virus 2 Not Detected     Parainfluenza Virus 3 Not Detected     Parainfluenza Virus 4 Not Detected     RSV, PCR Not Detected     Bordetella pertussis pcr Not Detected     Bordetella parapertussis PCR Not Detected     Chlamydophila pneumoniae PCR Not Detected     Mycoplasma pneumo by PCR Not Detected    Narrative:      In the setting of a positive respiratory panel with a viral infection PLUS a negative procalcitonin without other underlying concern for bacterial infection, consider observing off antibiotics or discontinuation of antibiotics and continue supportive care. If the respiratory panel is positive for atypical bacterial infection (Bordetella pertussis, Chlamydophila pneumoniae, or Mycoplasma pneumoniae), consider antibiotic de-escalation to target atypical bacterial infection.    Legionella Antigen, Urine - Urine, Urine, Clean Catch [[id number]]  (Normal) Collected: 04/15/23 2118    Lab Status: Final result Specimen: Urine, Clean Catch Updated: 04/15/23 2144     LEGIONELLA ANTIGEN, URINE Negative    S. Pneumo Ag Urine or CSF - Urine, Urine, Clean Catch [[id number]]  (Normal) Collected: 04/15/23 2118    Lab Status: Final result Specimen: Urine, Clean Catch Updated: 04/15/23 2144     Strep Pneumo Ag Negative    Urine Culture - Urine, Urine, Clean Catch [[id number]]  (Normal) Collected: 04/15/23 2118    Lab Status: Final result Specimen: Urine, Clean Catch Updated: 04/16/23 2200     Urine Culture No growth    MRSA Screen, PCR (Inpatient) - Swab, Nares [[id number]]  (Normal) Collected: 04/15/23 1646    Lab Status: Final result Specimen: Swab from Nares Updated: 04/15/23 1824     MRSA PCR No MRSA Detected    Narrative:      The negative predictive value of this diagnostic test is high and should only be used to consider de-escalating anti-MRSA therapy. A positive result may indicate colonization with MRSA and must be correlated clinically.    COVID PRE-OP / PRE-PROCEDURE SCREENING ORDER (NO  ISOLATION) - Swab, Nasopharynx [[id number]]  (Normal) Collected: 04/15/23 0858    Lab Status: Final result Specimen: Swab from Nasopharynx Updated: 04/15/23 0958    Narrative:      The following orders were created for panel order COVID PRE-OP / PRE-PROCEDURE SCREENING ORDER (NO ISOLATION) - Swab, Nasopharynx.  Procedure                               Abnormality         Status                     ---------                               -----------         ------                     COVID-19, FLU A/B, RSV P...[[id number]]  Normal              Final result                 Please view results for these tests on the individual orders.    COVID-19, FLU A/B, RSV PCR - Swab, Nasopharynx [[id number]]  (Normal) Collected: 04/15/23 0858    Lab Status: Final result Specimen: Swab from Nasopharynx Updated: 04/15/23 0958     COVID19 Not Detected     Influenza A PCR Not Detected     Influenza B PCR Not Detected     RSV, PCR Not Detected    Narrative:      Fact sheet for providers: https://www.fda.gov/media/425722/download    Fact sheet for patients: https://www.fda.gov/media/805255/download    Test performed by PCR.    Blood Culture With NANCY - Blood, Arm, Right [[id number]]  (Normal) Collected: 04/15/23 0855    Lab Status: Preliminary result Specimen: Blood from Arm, Right Updated: 04/16/23 1100     Blood Culture No growth at 24 hours    Blood Culture With NANCY - Blood, Arm, Right [[id number]]  (Normal) Collected: 04/15/23 0850    Lab Status: Preliminary result Specimen: Blood from Arm, Right Updated: 04/17/23 0930     Blood Culture No growth at 2 days            MARIELA Morris PharmD  04/17/23 10:55 CDT

## 2023-04-17 NOTE — PROGRESS NOTES
Assessment tool to be used for patients with existing breathing treatments ordered by hospitalist                               Respiratory Therapist Driven Protocol - RT to Assess and Treat Algorithm    Item 0 Points 1 Point 2 Points 3 Points 4 Points Subtotal   Mental Status Alert, orientated, cooperative Lethargic, follows commands Confused, not following commands Obtunded or Somnolent Comatose 0   Respiratory Pattern Regular RR  8-16 breaths/minute Increased RR  18-25 breaths/minute Dyspnea on exertion, irregular RR  26-30/minute Shortness of breath,  RR 31-35/minute Accessory muscle use, severe SOB  RR > 35/minute 0   Breath Sounds Clear Decreased unilaterally Decreased bilaterally Basilar crackles Wheezing and/or rhonchi 0   Cough Strong, spontaneous non-productive Strong productive Weak, non-productive Weak productive or weak with rhonchi Absent or may require suctioning 0   Pulmonary Status Nonsmoker or no previous history > 1 year quit < 1 PPD  < 1 year quit >  or = 1 PPD Diagnosed pulmonary disease (severe or chronic) Severe or chronic pulmonary disease with exacerbation 0   Surgical Status None General surgery (non-abdominal or non-thoracic) Lower abdominal Thoracic or upper abdominal Thoracic with pulmonary disease 0   Chest X-ray Clear Chronic changes Infiltrates, atelectasis or pleural effusion Infiltrates > 1 lobe Diffuse infiltrates and atelectasis and/or effusions 0   Activity level Ambulatory Ambulatory with assistance Non-ambulatory Paraplegic Quadriplegic 0                     Total Score 0   Score    Drug Therapy Frequency  20 or >    Q4 Duoneb & Q3 Albuterol PRN 15 - 19     Q6 Duoneb & Q4 Albuterol PRN 10 - 14    QID Duoneb & Q4 Albuterol PRN 5 - 9    TID Duoneb & Q6 Albuterol PRN 0 - 4    Q4 PRN Duoneb or Q4 PRN Albuterol    Incentive Spirometry - Initial RT instruct    Lung Expansion Therapy (PEP) Bronchopulmonary Hygiene (CPT)   Q4 & PRN - Severe atelectasis, poor  oxygenation Q4 - copious secretions, dyspnea, unable to sleep, mucus plugging   QID - High risk for persistent atelectasis, existence of atelectasis QID & Q4 PRN - Moderate secretion production   TID - At risk for developing atelectasis TID - small amounts of secretions with poor cough   BID - prevention of atelectasis BID - unable to breathe deeply and cough spontaneously   *RT Protocol patients will be re-assessed/re-evaluated every 48 hours.    *Patients who are home nebulizer treatments will be protocoled to no less than their home regimen and will remain     on their home regimen with re-evaluations as needed with changes in patient condition.    RT Comments/Recommendations: making nebs q4prn

## 2023-04-17 NOTE — PROGRESS NOTES
[facility] [place] [place] [place] [facility]    Patient: Brittney Stevens  2000   MR# [id number]   Acct# [id number]  04/17/23   07:41 CDT  Referring Provider: Sam Garcia MD    Chief Complaint: Shortness of breath, right lower lobe pneumonia, history of infectious mononucleosis, possible asthma, allergic rhinitis, anxiety    Interval history: patient is seen resting in bed on her right side with a wash clothe across her face/ forehead. She has had fever overnight of Tmax 103.1 treated with cool clothe, cool room, removal of blankets and Tylenol. Current temp 99.9. Her respiratory panel showed positive for human metapneumovirus and Adenovirus. WBC 7.25. No new imaging. She is feeling a little improved after the Tylenol. She is trying to rest this am as she did not sleep well last night. She was on room air and is now on 2L with sat of 98%. No other overnight events.     Meds:  albuterol, 1.25 mg, Nebulization, 4x Daily - RT  azithromycin, 500 mg, Intravenous, Q24H  cefTRIAXone, 1 g, Intravenous, Q24H  cetirizine, 10 mg, Oral, Daily  dextromethorphan polistirex ER, 60 mg, Oral, Q12H  fluticasone, 2 spray, Each Nare, Daily  sodium chloride, 10 mL, Intravenous, Q12H      lactated ringers, 125 mL/hr, Last Rate: 125 mL/hr (04/17/23 0557)        Physical Exam:  SpO2 Percentage    04/17/23 0424 04/17/23 0719 04/17/23 0723   SpO2: 97% 95%  Comment: just  back from bathroom 99%     Body mass index is 22.87 kg/m².   Temp:  [98.1 °F (36.7 °C)-103.1 °F (39.5 °C)] 100.3 °F (37.9 °C)  Heart Rate:  [] 97  Resp:  [16-20] 16  BP: (100-115)/(51-57) 100/57No intake or output data in the 24 hours ending 04/17/23 0741  Physical Exam  Vitals and nursing note reviewed.   Constitutional:       General: She is not in acute distress.     Appearance: She is ill-appearing.      Interventions: Nasal cannula in place.   HENT:      Head: Normocephalic and atraumatic.      Nose: Nose normal.    Eyes:      General:         Right eye: No discharge.         Left eye: No discharge.   Cardiovascular:      Rate and Rhythm: Normal rate and regular rhythm.      Heart sounds: No murmur heard.    No friction rub. No gallop.   Pulmonary:      Effort: Pulmonary effort is normal.      Breath sounds: Wheezing present.   Abdominal:      General: Abdomen is flat.      Palpations: Abdomen is soft.   Musculoskeletal:         General: Normal range of motion.      Cervical back: Normal range of motion and neck supple.      Right lower leg: No edema.      Left lower leg: No edema.   Skin:     General: Skin is warm and dry.   Neurological:      General: No focal deficit present.      Mental Status: She is alert and oriented to person, place, and time.   Psychiatric:         Mood and Affect: Mood normal.         Behavior: Behavior normal.         Thought Content: Thought content normal.         Judgment: Judgment normal.         Electronically signed by JONNIE Flaherty, 4/17/2023, 07:41 CDT      Physician substantive portion: medical decision making  Result Review  Laboratory Data:  Results from last 7 days   Lab Units 04/17/23  0407 04/16/23  0342 04/15/23  0850   WBC 10*3/mm3 7.25 6.69 6.43   HEMOGLOBIN g/dL 11.8* 12.2 13.1   PLATELETS 10*3/mm3 194 167 166     Results from last 7 days   Lab Units 04/17/23  0407 04/16/23  0342 04/15/23  1039 04/15/23  0850   SODIUM mmol/L 139 142  --  134*   POTASSIUM mmol/L 3.6 4.1  --  3.3*   CO2 mmol/L 27.0 25.0  --  22.0   BUN mg/dL 3* 7  --  11   CREATININE mg/dL 0.63 0.47*  --  0.80   LACTATE mmol/L  --   --  0.7  --    CRP mg/dL  --   --   --  15.87*         Microbiology Results (last 10 days)     Procedure Component Value - Date/Time    Gastrointestinal Panel, PCR - Stool, Per Rectum [[id number]]  (Normal) Collected: 04/17/23 1241    Lab Status: Final result Specimen: Stool from Per Rectum Updated: 04/17/23 1400     Campylobacter Not Detected     Plesiomonas shigelloides Not  Detected     Salmonella Not Detected     Vibrio Not Detected     Vibrio cholerae Not Detected     Yersinia enterocolitica Not Detected     Enteroaggregative E. coli (EAEC) Not Detected     Enteropathogenic E. coli (EPEC) Not Detected     Enterotoxigenic E. coli (ETEC) lt/st Not Detected     Shiga-like toxin-producing E. coli (STEC) stx1/stx2 Not Detected     Shigella/Enteroinvasive E. coli (EIEC) Not Detected     Cryptosporidium Not Detected     Cyclospora cayetanensis Not Detected     Entamoeba histolytica Not Detected     Giardia lamblia Not Detected     Adenovirus F40/41 Not Detected     Astrovirus Not Detected     Norovirus GI/GII Not Detected     Rotavirus A Not Detected     Sapovirus (I, II, IV or V) Not Detected    Narrative:      If Aeromonas, Staphylococcus aureus or Bacillus cereus are suspected, please order [id number]: Stool Culture, Aeromonas, S aureus, B Cereus.    Respiratory Culture - Sputum, Cough [[id number]] Collected: 04/16/23 1956    Lab Status: Preliminary result Specimen: Sputum from Cough Updated: 04/17/23 0617     Gram Stain Moderate (3+) WBCs per low power field      Rare (1+) Epithelial cells per low power field      Few (2+) Gram positive cocci      Few (2+) Gram negative bacilli    Respiratory Panel PCR w/COVID-19(SARS-CoV-2) [facility]/NICK/DOROTEO/PAD/[facility]/MAD/YONG In-House, NP Swab in UTM/VTM, 3-4 HR TAT - Swab, Nasopharynx [[id number]]  (Abnormal) Collected: 04/16/23 1645    Lab Status: Final result Specimen: Swab from Nasopharynx Updated: 04/16/23 1751     ADENOVIRUS, PCR Detected     Coronavirus 229E Not Detected     Coronavirus HKU1 Not Detected     Coronavirus NL63 Not Detected     Coronavirus OC43 Not Detected     COVID19 Not Detected     Human Metapneumovirus Detected     Human Rhinovirus/Enterovirus Not Detected     Influenza A PCR Not Detected     Influenza B PCR Not Detected     Parainfluenza Virus 1 Not Detected     Parainfluenza Virus 2 Not Detected     Parainfluenza Virus 3 Not Detected      Parainfluenza Virus 4 Not Detected     RSV, PCR Not Detected     Bordetella pertussis pcr Not Detected     Bordetella parapertussis PCR Not Detected     Chlamydophila pneumoniae PCR Not Detected     Mycoplasma pneumo by PCR Not Detected    Narrative:      In the setting of a positive respiratory panel with a viral infection PLUS a negative procalcitonin without other underlying concern for bacterial infection, consider observing off antibiotics or discontinuation of antibiotics and continue supportive care. If the respiratory panel is positive for atypical bacterial infection (Bordetella pertussis, Chlamydophila pneumoniae, or Mycoplasma pneumoniae), consider antibiotic de-escalation to target atypical bacterial infection.    Legionella Antigen, Urine - Urine, Urine, Clean Catch [[id number]]  (Normal) Collected: 04/15/23 2118    Lab Status: Final result Specimen: Urine, Clean Catch Updated: 04/15/23 2144     LEGIONELLA ANTIGEN, URINE Negative    S. Pneumo Ag Urine or CSF - Urine, Urine, Clean Catch [[id number]]  (Normal) Collected: 04/15/23 2118    Lab Status: Final result Specimen: Urine, Clean Catch Updated: 04/15/23 2144     Strep Pneumo Ag Negative    Urine Culture - Urine, Urine, Clean Catch [[id number]]  (Normal) Collected: 04/15/23 2118    Lab Status: Final result Specimen: Urine, Clean Catch Updated: 04/16/23 2200     Urine Culture No growth    MRSA Screen, PCR (Inpatient) - Swab, Nares [[id number]]  (Normal) Collected: 04/15/23 1646    Lab Status: Final result Specimen: Swab from Nares Updated: 04/15/23 1824     MRSA PCR No MRSA Detected    Narrative:      The negative predictive value of this diagnostic test is high and should only be used to consider de-escalating anti-MRSA therapy. A positive result may indicate colonization with MRSA and must be correlated clinically.    COVID PRE-OP / PRE-PROCEDURE SCREENING ORDER (NO ISOLATION) - Swab, Nasopharynx [[id number]]  (Normal) Collected: 04/15/23 0858    Lab  Status: Final result Specimen: Swab from Nasopharynx Updated: 04/15/23 0958    Narrative:      The following orders were created for panel order COVID PRE-OP / PRE-PROCEDURE SCREENING ORDER (NO ISOLATION) - Swab, Nasopharynx.  Procedure                               Abnormality         Status                     ---------                               -----------         ------                     COVID-19, FLU A/B, RSV P...[[id number]]  Normal              Final result                 Please view results for these tests on the individual orders.    COVID-19, FLU A/B, RSV PCR - Swab, Nasopharynx [[id number]]  (Normal) Collected: 04/15/23 0858    Lab Status: Final result Specimen: Swab from Nasopharynx Updated: 04/15/23 0958     COVID19 Not Detected     Influenza A PCR Not Detected     Influenza B PCR Not Detected     RSV, PCR Not Detected    Narrative:      Fact sheet for providers: https://www.fda.gov/media/559127/download    Fact sheet for patients: https://www.fda.gov/media/573151/download    Test performed by PCR.    Blood Culture With NANCY - Blood, Arm, Right [[id number]]  (Normal) Collected: 04/15/23 0855    Lab Status: Preliminary result Specimen: Blood from Arm, Right Updated: 04/17/23 1100     Blood Culture No growth at 2 days    Blood Culture With NANCY - Blood, Arm, Right [[id number]]  (Normal) Collected: 04/15/23 0850    Lab Status: Preliminary result Specimen: Blood from Arm, Right Updated: 04/17/23 0930     Blood Culture No growth at 2 days         Recent films:  XR Chest PA & Lateral    Result Date: 4/16/2023  EXAMINATION: Chest 2 views 04/16/2023  HISTORY: Pneumonia  FINDINGS: Today's exam is compared to previous study one day earlier. There is persistent right lower lobe pneumonia showing slight worsening when compared to the previous exam. Lungs are otherwise clear. No effusion or free air present.      Impression: 1.. Worsening right lower lobe pneumonia. This report was finalized on 04/16/2023 15:36 by  Dr. Clarence Riley MD.     Personal review of imaging: Reviewed recent imaging studies and agree with current interpretation patient has hyperinflated lung fields and right lower lobe pneumonia.      Pulmonary Assessment:    1. Right lower lobe pneumonia with shortness of breath  2. Adenovirus and metapneumovirus tracheobronchitis  3. History of infectious mononucleosis  4. History of peritonsillar abscess  5. Mild intermittent asthma  6. Allergic rhinitis  7. High fever  8. Sinus tachycardia    Recommend/plan:   · Patient was seen in the follow-up visit in pulmonary rounds in medical floor today  · She is already getting antibiotics with Rocephin and azithromycin and still having temperature  · This morning temperature went up to 103 F.  Cultures sent.  Antibiotics changed to Zosyn  · Rocephin and azithromycin stopped.  Her respiratory swab is positive for metapneumovirus and adenovirus.  · Continue respiratory isolation.  Patient did not want to do MetaNeb and is getting regular albuterol nebulizer 4 times a day.  · Continue Tylenol for fever.  Patient is still on IV fluid.  Oral intake good so far.  · Increase physical activity as tolerated.  Encourage incentive spirometry and flutter valve  · Repeat Labs and imaging studies from time to time.  Patient may need ID consult.  · History of infectious mononucleosis in the past and also had history of peritonsillar abscess.  · Nutritional support.  Pain and anxiety control DVT and stress ulcer prophylaxis addressed  · CODE STATUS: Full.  Overall prognosis guarded but stable overall.  · We will follow      This visit was performed by both a physician and an [facility] RN.  I personally evaluated and examined the patient.  I performed all aspects of the medical decision making as documented.    Electronically signed by     Tomasa Gage MD,  Pulmonologist/Intensivist   4/17/2023, 16:48 CDT

## 2023-04-17 NOTE — PROGRESS NOTES
PT REFUSING METANEB, BUT AGREEING TO TAKE NEBS.  PT ALSO STATED SHE DID NOT WANT TO BE WOKE UP DURING THE NIGHT.

## 2023-04-17 NOTE — PAYOR COMM NOTE
"Jayy Stevens (22 y.o. Female) [id number]  New INPT  Request  ADMIT 4/15   [facility] phone   Fax         Date of Birth   2000    Social Security Number       Address   [address] [place] [address]    Home Phone       MRN   [id number]       Evangel[employer]   Other    Marital Status   Single                            Admission Date   4/15/23    Admission Type   Emergency    Admitting Provider   Sam Garcia MD    Attending Provider   Sam Garcia MD    Department, Room/Bed   [facility] 3C, 364/1       Discharge Date       Discharge Disposition       Discharge Destination                               Attending Provider: Sam Garcia MD    Allergies: No Known Allergies    Isolation: Droplet, Contact   Infection: Adenovirus (Respiratory) (04/16/23), Human Metapneumovirus  (04/16/23)   Code Status: CPR    Ht: 160 cm (63\")   Wt: 58.6 kg (129 lb 1.6 oz)    Admission Cmt: None   Principal Problem: Pneumonia of right lung due to infectious organism, unspecified part of lung [J18.9]                 Active Insurance as of 4/15/2023     Primary Coverage     Payor Plan Insurance Group Employer/Plan Group    [employer] [place] [employer] [place]ICAID      Payor Plan Address Payor Plan Phone Number Payor Plan Fax Number Effective Dates    [address] [address] [phone]  6/25/2022 - None Entered    [facility] [id number]       Subscriber Name Subscriber Birth Date Member ID       JAYY STEVENS 2000 [id number]                 Emergency Contacts      (Rel.) Home Phone Work Phone Mobile Phone    leesa stevens (Father) -- -- [phone]               History & Physical      Tate Nash MD at 04/15/23 1404              [facility] Medicine Services  HISTORY AND PHYSICAL    Date of Admission: 4/15/2023  Primary Care Physician: Sam Smith MD    Subjective   Primary Historian: " Patient and father.    Chief Complaint: Pneumonia/fever.    History of Present Illness  Patient is a 22-year-old presented to ER with complaining coughing for last 6 days.  Patient went to [facility] on Monday, 4/9/2023 was evaluated and was given Levaquin and Metro Dosepak to go home with.  Patient has not been better, patient went to clinic yesterday and got a shot of Rocephin and cough medication with discharge home.  Patient came to ER today with continue of coughing and not feeling any better.  CT scan shows right lung pneumonia.  Patient pain and weakness fever and chills with it.  Patient received Rocephin and a azithromycin in the ER.  Patient will receive neb treatment and 2 L bolus and a Solu-Medrol IV in ER.    Review of Systems   Constitutional: Positive for activity change, appetite change, chills, fatigue and fever.   HENT: Negative for hearing loss, nosebleeds, tinnitus and trouble swallowing.    Eyes: Negative for visual disturbance.   Respiratory: Positive for cough and shortness of breath. Negative for chest tightness and wheezing.    Cardiovascular: Negative for chest pain, palpitations and leg swelling.   Gastrointestinal: Negative for abdominal distention, abdominal pain, blood in stool, constipation, diarrhea, nausea and vomiting.   Endocrine: Negative for cold intolerance, heat intolerance, polydipsia, polyphagia and polyuria.   Genitourinary: Negative for decreased urine volume, difficulty urinating, dysuria, flank pain, frequency and hematuria.   Musculoskeletal: Negative for arthralgias, joint swelling and myalgias.   Skin: Negative for rash.   Allergic/Immunologic: Negative for immunocompromised state.   Neurological: Positive for weakness. Negative for dizziness, syncope, light-headedness and headaches.   Hematological: Negative for adenopathy. Does not bruise/bleed easily.   Psychiatric/Behavioral: Negative for confusion and sleep disturbance. The patient is not nervous/anxious.      "  Otherwise complete ROS reviewed and negative except as mentioned in the HPI.    Past Medical History: History reviewed. No pertinent past medical history.  Past Surgical History:  Past Surgical History:   Procedure Laterality Date   • DENTAL PROCEDURE     • PERITONSILLAR ABSCESS INCISION AND DRAINAGE Bilateral 07/02/2022    Procedure: PERITONSILLAR ABSCESS INCISION AND DRAINAGE;  Surgeon: Rajesh Salinas Jr., MD;  Location: [facility];  Service: ENT;  Laterality: Bilateral;   • TONSILLECTOMY       Social History:  reports that she has never smoked. She has never used smokeless tobacco. She reports that she does not drink alcohol and does not use drugs.    Family History: family history includes Lung cancer in her maternal grandfather.       Allergies:  No Known Allergies    Medications:  Prior to Admission medications    Medication Sig Start Date End Date Taking? Authorizing Provider   dexamethasone (DECADRON) 4 MG tablet Take 1 tablet by mouth 3 (Three) Times a Day. 7/3/22   Rajesh Salinas Jr., MD   Taytulla 1-20 MG-MCG(24) capsule TAKE 1 CAPSULE BY MOUTH DAILY 9/8/21   Ijeoma Ross APRN     I have utilized all available immediate resources to obtain, update, or review the patient's current medications (including all prescriptions, over-the-counter products, herbals, cannabis/cannabidiol products, and vitamin/mineral/dietary (nutritional) supplements).    Objective     Vital Signs: BP 94/56 (BP Location: Left arm, Patient Position: Lying)   Pulse 88   Temp 98.3 °F (36.8 °C) (Oral)   Resp 16   Ht 160 cm (63\")   Wt 54.4 kg (120 lb)   LMP 04/15/2023   SpO2 96%   BMI 21.26 kg/m²   Physical Exam  Vitals and nursing note reviewed.   HENT:      Head: Normocephalic.   Eyes:      Conjunctiva/sclera: Conjunctivae normal.      Pupils: Pupils are equal, round, and reactive to light.   Neck:      Vascular: No JVD.   Cardiovascular:      Rate and Rhythm: Normal rate and regular rhythm.      Heart " sounds: Normal heart sounds.   Pulmonary:      Effort: Pulmonary effort is normal. No respiratory distress.      Breath sounds: No wheezing or rales.      Comments: Crackles right lower base.  Chest:      Chest wall: No tenderness.   Abdominal:      General: Bowel sounds are normal. There is no distension.      Palpations: Abdomen is soft.      Tenderness: There is no abdominal tenderness.   Musculoskeletal:         General: No tenderness or deformity. Normal range of motion.      Cervical back: Neck supple.   Skin:     General: Skin is warm and dry.      Findings: No rash.   Neurological:      Mental Status: She is alert and oriented to person, place, and time.      Cranial Nerves: No cranial nerve deficit.      Motor: No abnormal muscle tone.      Deep Tendon Reflexes: Reflexes normal.   Psychiatric:         Mood and Affect: Mood normal.         Behavior: Behavior normal.         Thought Content: Thought content normal.         Judgment: Judgment normal.              Results Reviewed:  Lab Results (last 24 hours)     Procedure Component Value Units Date/Time    Lactic Acid, Plasma [[id number]]  (Normal) Collected: 04/15/23 1039    Specimen: Blood Updated: 04/15/23 1100     Lactate 0.7 mmol/L     Blood Culture With NANCY - Blood, Arm, Right [[id number]] Collected: 04/15/23 0855    Specimen: Blood from Arm, Right Updated: 04/15/23 1052    COVID PRE-OP / PRE-PROCEDURE SCREENING ORDER (NO ISOLATION) - Swab, Nasopharynx [[id number]]  (Normal) Collected: 04/15/23 0858    Specimen: Swab from Nasopharynx Updated: 04/15/23 0958    Narrative:      The following orders were created for panel order COVID PRE-OP / PRE-PROCEDURE SCREENING ORDER (NO ISOLATION) - Swab, Nasopharynx.  Procedure                               Abnormality         Status                     ---------                               -----------         ------                     COVID-19, FLU A/B, RSV P...[[id number]]  Normal              Final result            "      Please view results for these tests on the individual orders.    COVID-19, FLU A/B, RSV PCR - Swab, Nasopharynx [[id number]]  (Normal) Collected: 04/15/23 0858    Specimen: Swab from Nasopharynx Updated: 04/15/23 0958     COVID19 Not Detected     Influenza A PCR Not Detected     Influenza B PCR Not Detected     RSV, PCR Not Detected    Narrative:      Fact sheet for providers: https://www.fda.gov/media/818655/download    Fact sheet for patients: https://www.fda.gov/media/604152/download    Test performed by PCR.    Procalcitonin [[id number]]  (Abnormal) Collected: 04/15/23 0850    Specimen: Blood Updated: 04/15/23 0938     Procalcitonin 0.43 ng/mL     Narrative:      As a Marker for Sepsis (Non-Neonates):    1. <0.5 ng/mL represents a low risk of severe sepsis and/or septic shock.  2. >2 ng/mL represents a high risk of severe sepsis and/or septic shock.    As a Marker for Lower Respiratory Tract Infections that require antibiotic therapy:    PCT on Admission    Antibiotic Therapy       6-12 Hrs later    >0.5                Strongly Recommended  >0.25 - <0.5        Recommended   0.1 - 0.25          Discouraged              Remeasure/reassess PCT  <0.1                Strongly Discouraged     Remeasure/reassess PCT    As 28 day mortality risk marker: \"Change in Procalcitonin Result\" (>80% or <=80%) if Day 0 (or Day 1) and Day 4 values are available. Refer to http://www.[facility]-pct-calculator.com    Change in PCT <=80%  A decrease of PCT levels below or equal to 80% defines a positive change in PCT test result representing a higher risk for 28-day all-cause mortality of patients diagnosed with severe sepsis for septic shock.    Change in PCT >80%  A decrease of PCT levels of more than 80% defines a negative change in PCT result representing a lower risk for 28-day all-cause mortality of patients diagnosed with severe sepsis or septic shock.       C-reactive Protein [[id number]]  (Abnormal) Collected: 04/15/23 0850    " Specimen: Blood Updated: 04/15/23 0933     C-Reactive Protein 15.87 mg/dL     Comprehensive Metabolic Panel [[id number]]  (Abnormal) Collected: 04/15/23 0850    Specimen: Blood Updated: 04/15/23 0933     Glucose 88 mg/dL      BUN 11 mg/dL      Creatinine 0.80 mg/dL      Sodium 134 mmol/L      Potassium 3.3 mmol/L      Chloride 95 mmol/L      CO2 22.0 mmol/L      Calcium 8.9 mg/dL      Total Protein 7.1 g/dL      Albumin 4.2 g/dL      ALT (SGPT) 12 U/L      AST (SGOT) 20 U/L      Alkaline Phosphatase 71 U/L      Total Bilirubin 0.5 mg/dL      Globulin 2.9 gm/dL      A/G Ratio 1.4 g/dL      BUN/Creatinine Ratio 13.8     Anion Gap 17.0 mmol/L      eGFR 106.3 mL/min/1.73     Narrative:      GFR Normal >60  Chronic Kidney Disease <60  Kidney Failure <15      Urinalysis With Culture If Indicated - Urine, Clean Catch [[id number]]  (Abnormal) Collected: 04/15/23 0850    Specimen: Urine, Clean Catch Updated: 04/15/23 0925     Color, UA Dark Yellow     Appearance, UA Clear     pH, UA 5.5     Specific Gravity, UA 1.028     Glucose, UA Negative     Ketones, UA 80 mg/dL (3+)     Bilirubin, UA Negative     Blood, UA Moderate (2+)     Protein, UA 30 mg/dL (1+)     Leuk Esterase, UA Negative     Nitrite, UA Negative     Urobilinogen, UA 0.2 E.U./dL    Narrative:      In absence of clinical symptoms, the presence of pyuria, bacteria, and/or nitrites on the urinalysis result does not correlate with infection.    Urinalysis, Microscopic Only - Urine, Clean Catch [[id number]]  (Abnormal) Collected: 04/15/23 0850    Specimen: Urine, Clean Catch Updated: 04/15/23 0925     RBC, UA None Seen /HPF      WBC, UA 0-2 /HPF      Comment: Urine culture not indicated.        Bacteria, UA 1+ /HPF      Squamous Epithelial Cells, UA 3-6 /HPF      Hyaline Casts, UA 0-2 /LPF      Mucus, UA Small/1+ /HPF      Methodology Manual Light Microscopy    D-dimer, Quantitative [[id number]]  (Abnormal) Collected: 04/15/23 0850    Specimen: Blood Updated: 04/15/23  "0923     D-Dimer, Quantitative 1.05 MCGFEU/mL     Narrative:      According to the assay 's published package insert, a normal (<0.50 MCGFEU/mL) D-dimer result in conjunction with a non-high clinical probability assessment, excludes deep vein thrombosis (DVT) and pulmonary embolism (PE) with high sensitivity.    D-dimer values increase with age and this can make VTE exclusion of an older population difficult. To address this, the American College of Phys[employer], based on best available evidence and recent guidelines, recommends that clinicians use age-adjusted D-dimer thresholds in patients greater than 50 years of age with: a) a low probability of PE who do not meet all Pulmonary Embolism Rule Out Criteria, or b) in those with intermediate probability of PE.   The formula for an age-adjusted D-dimer cut-off is \"age/100\".  For example, a 60 year old patient would have an age-adjusted cut-off of 0.60 MCGFEU/mL and an 80 year old 0.80 MCGFEU/mL.    Blood Culture With NANCY - Blood, Arm, Right [[id number]] Collected: 04/15/23 0850    Specimen: Blood from Arm, Right Updated: 04/15/23 0915    CBC & Differential [[id number]]  (Abnormal) Collected: 04/15/23 0850    Specimen: Blood Updated: 04/15/23 0911    Narrative:      The following orders were created for panel order CBC & Differential.  Procedure                               Abnormality         Status                     ---------                               -----------         ------                     CBC Auto Differential[[id number]]        Abnormal            Final result                 Please view results for these tests on the individual orders.    CBC Auto Differential [[id number]]  (Abnormal) Collected: 04/15/23 0850    Specimen: Blood Updated: 04/15/23 0911     WBC 6.43 10*3/mm3      RBC 4.69 10*6/mm3      Hemoglobin 13.1 g/dL      Hematocrit 40.8 %      MCV 87.0 fL      MCH 27.9 pg      MCHC 32.1 g/dL      RDW 12.7 %      RDW-SD 40.4 fl      MPV " 10.7 fL      Platelets 166 10*3/mm3      Neutrophil % 77.9 %      Lymphocyte % 9.6 %      Monocyte % 12.1 %      Eosinophil % 0.0 %      Basophil % 0.2 %      Immature Grans % 0.2 %      Neutrophils, Absolute 5.01 10*3/mm3      Lymphocytes, Absolute 0.62 10*3/mm3      Monocytes, Absolute 0.78 10*3/mm3      Eosinophils, Absolute 0.00 10*3/mm3      Basophils, Absolute 0.01 10*3/mm3      Immature Grans, Absolute 0.01 10*3/mm3      nRBC 0.0 /100 WBC     POC Pregnancy, Urine [[id number]]  (Normal) Collected: 04/15/23 0902    Specimen: Urine Updated: 04/15/23 0903     HCG, Urine, QL Negative     Lot Number [id number]     Internal Positive Control Positive     Internal Negative Control Negative     Expiration Date 4/30/2020        Imaging Results (Last 24 Hours)     Procedure Component Value Units Date/Time    CT Angiogram Chest [[id number]] Collected: 04/15/23 1151     Updated: 04/15/23 1201    Narrative:      CT ANGIOGRAM CHEST- 4/15/2023 11:38 AM CDT      HISTORY: dyspnea/ tachycardia/ elevated dimer      COMPARISON: Chest x-ray dated 04/15/2023.      DOSE LENGTH PRODUCT: 112 mGy cm. Automated exposure control was also  utilized to decrease patient radiation dose.     TECHNIQUE: Helical tomographic images of the chest were obtained after  the administration of intravenous contrast following angiogram protocol.  Additionally, 3D and multiplanar reformatted images were provided.        FINDINGS:    Pulmonary arteries: There is adequate enhancement of the pulmonary  arteries to evaluate for central and segmental pulmonary emboli. There  are no filling defects within the main, lobar, segmental or visualized  subsegmental pulmonary arteries. The pulmonary arteries are within  normal limits for size.      Aorta and great vessels: Thoracic aorta is normal in caliber. No  dissection identified. No flow-limiting stenosis identified at the great  vessel origins.     Visualized neck base: The imaged portion of the base of the neck  appears  unremarkable.      Lungs: 9 cm right lower lobe consolidation, laterally, concerning for  pneumonia. Lungs are otherwise clear. The lungs are well expanded. No  pleural effusion. Airways are clear.     Heart: The heart is normal in size. There is no pericardial effusion.     Mediastinum and lymph nodes: Mild reactive adenopathy in the right hilum  and mediastinum..     Skeletal and soft tissues: Chest wall soft tissues are unremarkable. No  acute bony abnormality. Thoracic spine degenerative change.     Upper abdomen: The imaged portion of the upper abdomen demonstrates no  acute process.        Impression:      1. 9 cm consolidation in the lateral portion of the right lower lobe  which is likely pneumonia. Reactive adenopathy in the right hilum and  mediastinum.        This report was finalized on 04/15/2023 11:58 by Dr Mauricio Welch, .    XR Chest 2 View [[id number]] Collected: 04/15/23 0846     Updated: 04/15/23 0849    Narrative:      EXAMINATION: Chest 2 views 04/15/2023     HISTORY: Cough.     FINDINGS: Two-view exam of the chest demonstrates a right lower lobe  pneumonia with partial silhouetting of the right diaphragm. Lungs are  otherwise clear. No effusion or free air is present.       Impression:      1. Right lower lobe pneumonia.  This report was finalized on 04/15/2023 08:46 by Dr. Clarence Riley MD.        I have personally reviewed and interpreted the radiology studies and ECG obtained at time of admission.     Assessment / Plan   Assessment:   Active Hospital Problems    Diagnosis    • **Pneumonia of right lung due to infectious organism, unspecified part of lung        Treatment Plan  The patient will be admitted to my service here at [facility].     Pneumonia/fever.  Failed outpatient treatment.  Patient went to [place] [facility] 4/9/2023-received Levaquin, Medrol Dosepak, and cough medication and was sent home.  Patient went to see a clinic yesterday at [facility] was given Rocephin and  was sent home.  Patient came to ER today with similar symptoms.  Continue Rocephin and azithromycin.  Pneumonia protocol for now.  Tessalon Perle as needed.  Incentive spirometer.  Chest x-ray-Right lower lobe pneumonia.  CTA of the chest-9 cm consolidation in the lateral portion of the right lower lobe which is likely pneumonia, Reactive adenopathy in the right hilum and mediastinum.  Patient is on room air.    Hypotension.  IV fluids.    Hyponatremia.  Lactated ringer.    Hypokalemia.  Potassium p.o.     Nauseous.  Zofran as needed.    Nutrition.  Regular/house diet.    Blood cultures pending.  Legionella antigen.  MRSA screen.  Mycoplasma pneumonia.  Respiratory culture.  Respiratory PCR.  Strep pneumo.    Medical Decision Making  Number and Complexity of problems: Pneumonia/fever.  Differential Diagnosis: None.    Conditions and Status      Ongoing treatment.     [facility] Data  External documents reviewed: Previous note  Cardiac tracing (EKG, telemetry) interpretation: Sinus .  Radiology interpretation: X-ray/CT scan  Labs reviewed: Laboratory  Any tests that were considered but not ordered: Lab in a.m.     Decision rules/scores evaluated (example [id number], Wells, etc): None.     Discussed with: Patient and father     Care Planning  Shared decision making: Patient and father  Code status and discussions: Full code    Disposition  Social Determinants of Health that impact treatment or disposition: From home  Estimated length of stay is 2 to 5 days.     I confirmed that the patient's advanced care plan is present, code status is documented, and a surrogate decision maker is listed in the patient's medical record.     The patient's surrogate decision maker is father Ted.    The patient was seen and examined by me on 4/15/2023 at 1415.  Incentive stronger.  Electronically signed by Tate Nash MD, 04/15/23, 14:31 CDT.              Electronically signed by Tate Nash MD at 04/15/23 1443          Emergency  Department Notes      Mickie Farrar APRN at 04/15/23 0800          Subjective   History of Present Illness  Patient is a 22-year-old white female presents to the emergency department with cough for the past 6 days.  Patient was seen at [facility] 6 days ago with abdominal pain and had a CAT scan of her abdomen pelvis and advised that she had pneumonia.  She states she had a mild cough then.  She states she was started on Levaquin and albuterol inhaler.  She states she continued to get worse throughout the week.  She was seen at a primary care office yesterday because she was not doing any better.  She received an injection of Rocephin.  No repeat ancillary tests were done.  She was given a cough medication has not helped.  She states she feels very weak and continues to have cough.  She states she is getting worse.  She continues to run fever.  She does have some shortness of breath with exertion.  She states her cough is nonproductive.  She has had intermittent nausea without vomiting.  She has had a decreased appetite has not been eating or drinking over the last few days.  She denies any chest pain.  She states that she has not been swab for COVID or any other viral illness.  She is not for sure what her labs had shown at [facility].    History provided by:  Patient   used: No        Review of Systems   Constitutional: Negative.    HENT: Negative.    Eyes: Negative.    Respiratory:        Patient is a 22-year-old white female presents to the emergency department with cough for the past 6 days.  Patient was seen at [facility] 6 days ago with abdominal pain and had a CAT scan of her abdomen pelvis and advised that she had pneumonia.  She states she had a mild cough then.  She states she was started on Levaquin and albuterol inhaler.  She states she continued to get worse throughout the week.  She was seen at a primary care office yesterday  because she was not doing any better.  She received an injection of Rocephin.  No repeat ancillary tests were done.  She was given a cough medication has not helped.  She states she feels very weak and continues to have cough.  She states she is getting worse.  She continues to run fever.  She does have some shortness of breath with exertion.  She states her cough is nonproductive.  She has had intermittent nausea without vomiting.  She has had a decreased appetite has not been eating or drinking over the last few days.  She denies any chest pain.  She states that she has not been swab for COVID or any other viral illness.  She is not for sure what her labs had shown at [facility].     Cardiovascular: Negative.    Gastrointestinal: Negative.    Endocrine: Negative.    Genitourinary: Negative.    Musculoskeletal: Negative.    Skin: Negative.    Allergic/Immunologic: Negative.    Neurological: Negative.    Hematological: Negative.    Psychiatric/Behavioral: Negative.    All other systems reviewed and are negative.      History reviewed. No pertinent past medical history.    No Known Allergies    Past Surgical History:   Procedure Laterality Date   • DENTAL PROCEDURE     • PERITONSILLAR ABSCESS INCISION AND DRAINAGE Bilateral 07/02/2022    Procedure: PERITONSILLAR ABSCESS INCISION AND DRAINAGE;  Surgeon: Rajesh Salinas Jr., MD;  Location: [facility];  Service: ENT;  Laterality: Bilateral;   • TONSILLECTOMY         Family History   Problem Relation Age of Onset   • Lung cancer Maternal Grandfather    • Breast cancer Neg Hx    • Ovarian cancer Neg Hx    • Colon cancer Neg Hx        Social History     Socioeconomic History   • Marital status: Single   Tobacco Use   • Smoking status: Never     Passive exposure: Never   • Smokeless tobacco: Never   Vaping Use   • Vaping Use: Never used   Substance and Sexual Activity   • Alcohol use: No   • Drug use: No   • Sexual activity: Defer     Birth control/protection: OCP  "      Prior to Admission medications    Medication Sig Start Date End Date Taking? Authorizing Provider   dexamethasone (DECADRON) 4 MG tablet Take 1 tablet by mouth 3 (Three) Times a Day. 7/3/22   Rajesh Salinas Jr., MD Taytulla 1-20 MG-MCG(24) capsule TAKE 1 CAPSULE BY MOUTH DAILY 9/8/21   Ijeoma Ross, JONNIE       /53 (BP Location: Left arm, Patient Position: Lying)   Pulse 86   Temp 98.1 °F (36.7 °C) (Oral)   Resp 16   Ht 160 cm (63\")   Wt 58.6 kg (129 lb 1.6 oz)   LMP 04/15/2023   SpO2 98%   BMI 22.87 kg/m²     Objective   Physical Exam  Vitals and nursing note reviewed.   Constitutional:       Appearance: She is well-developed.      Comments: Nontoxic-appearing.  No acute distress.  Skin slight pallor   HENT:      Head: Normocephalic and atraumatic.   Eyes:      Conjunctiva/sclera: Conjunctivae normal.      Pupils: Pupils are equal, round, and reactive to light.   Neck:      Thyroid: No thyromegaly.      Trachea: No tracheal deviation.   Cardiovascular:      Rate and Rhythm: Regular rhythm. Tachycardia present.      Heart sounds: Normal heart sounds.      Comments: Sinus tachycardia at 120  Pulmonary:      Effort: Pulmonary effort is normal. No respiratory distress.      Breath sounds: No wheezing or rales.      Comments: Respirations are even and unlabored.  There are some mild expiratory wheezing noted throughout.  Diminished lung sounds bilateral bases  Chest:      Chest wall: No tenderness.   Abdominal:      General: Bowel sounds are normal.      Palpations: Abdomen is soft.   Musculoskeletal:         General: Normal range of motion.      Cervical back: Normal range of motion and neck supple.   Skin:     General: Skin is warm and dry.      Comments: Mild pallor noted   Neurological:      Mental Status: She is alert and oriented to person, place, and time.      Cranial Nerves: No cranial nerve deficit.      Deep Tendon Reflexes: Reflexes are normal and symmetric.   Psychiatric:  "        Behavior: Behavior normal.         Thought Content: Thought content normal.         Judgment: Judgment normal.         Procedures        Lab Results (last 24 hours)     Procedure Component Value Units Date/Time    CBC & Differential [[id number]]  (Abnormal) Collected: 04/15/23 0850    Specimen: Blood Updated: 04/15/23 0911    Narrative:      The following orders were created for panel order CBC & Differential.  Procedure                               Abnormality         Status                     ---------                               -----------         ------                     CBC Auto Differential[[id number]]        Abnormal            Final result                 Please view results for these tests on the individual orders.    Comprehensive Metabolic Panel [[id number]]  (Abnormal) Collected: 04/15/23 0850    Specimen: Blood Updated: 04/15/23 0933     Glucose 88 mg/dL      BUN 11 mg/dL      Creatinine 0.80 mg/dL      Sodium 134 mmol/L      Potassium 3.3 mmol/L      Chloride 95 mmol/L      CO2 22.0 mmol/L      Calcium 8.9 mg/dL      Total Protein 7.1 g/dL      Albumin 4.2 g/dL      ALT (SGPT) 12 U/L      AST (SGOT) 20 U/L      Alkaline Phosphatase 71 U/L      Total Bilirubin 0.5 mg/dL      Globulin 2.9 gm/dL      A/G Ratio 1.4 g/dL      BUN/Creatinine Ratio 13.8     Anion Gap 17.0 mmol/L      eGFR 106.3 mL/min/1.73     Narrative:      GFR Normal >60  Chronic Kidney Disease <60  Kidney Failure <15      Blood Culture With NANCY - Blood, Arm, Right [[id number]]  (Normal) Collected: 04/15/23 0850    Specimen: Blood from Arm, Right Updated: 04/15/23 2131     Blood Culture No growth at less than 24 hours    D-dimer, Quantitative [[id number]]  (Abnormal) Collected: 04/15/23 0850    Specimen: Blood Updated: 04/15/23 0923     D-Dimer, Quantitative 1.05 MCGFEU/mL     Narrative:      According to the assay 's published package insert, a normal (<0.50 MCGFEU/mL) D-dimer result in conjunction with a non-high  "clinical probability assessment, excludes deep vein thrombosis (DVT) and pulmonary embolism (PE) with high sensitivity.    D-dimer values increase with age and this can make VTE exclusion of an older population difficult. To address this, the [employer], based on best available evidence and recent guidelines, recommends that clinicians use age-adjusted D-dimer thresholds in patients greater than 50 years of age with: a) a low probability of PE who do not meet all Pulmonary Embolism Rule Out Criteria, or b) in those with intermediate probability of PE.   The formula for an age-adjusted D-dimer cut-off is \"age/100\".  For example, a 60 year old patient would have an age-adjusted cut-off of 0.60 MCGFEU/mL and an 80 year old 0.80 MCGFEU/mL.    Urinalysis With Culture If Indicated - Urine, Clean Catch [[id number]]  (Abnormal) Collected: 04/15/23 0850    Specimen: Urine, Clean Catch Updated: 04/15/23 1448     Color, UA Dark Yellow     Appearance, UA Clear     pH, UA 5.5     Specific Gravity, UA 1.028     Glucose, UA Negative     Ketones, UA 80 mg/dL (3+)     Bilirubin, UA Negative     Blood, UA Moderate (2+)     Protein, UA 30 mg/dL (1+)     Leuk Esterase, UA Negative     Nitrite, UA Negative     Urobilinogen, UA 0.2 E.U./dL    Narrative:      In absence of clinical symptoms, the presence of pyuria, bacteria, and/or nitrites on the urinalysis result does not correlate with infection.    C-reactive Protein [[id number]]  (Abnormal) Collected: 04/15/23 0850    Specimen: Blood Updated: 04/15/23 0933     C-Reactive Protein 15.87 mg/dL     Procalcitonin [[id number]]  (Abnormal) Collected: 04/15/23 0850    Specimen: Blood Updated: 04/15/23 0938     Procalcitonin 0.43 ng/mL     Narrative:      As a Marker for Sepsis (Non-Neonates):    1. <0.5 ng/mL represents a low risk of severe sepsis and/or septic shock.  2. >2 ng/mL represents a high risk of severe sepsis and/or septic shock.    As a Marker for Lower " "Respiratory Tract Infections that require antibiotic therapy:    PCT on Admission    Antibiotic Therapy       6-12 Hrs later    >0.5                Strongly Recommended  >0.25 - <0.5        Recommended   0.1 - 0.25          Discouraged              Remeasure/reassess PCT  <0.1                Strongly Discouraged     Remeasure/reassess PCT    As 28 day mortality risk marker: \"Change in Procalcitonin Result\" (>80% or <=80%) if Day 0 (or Day 1) and Day 4 values are available. Refer to [email or web address]    Change in PCT <=80%  A decrease of PCT levels below or equal to 80% defines a positive change in PCT test result representing a higher risk for 28-day all-cause mortality of patients diagnosed with severe sepsis for septic shock.    Change in PCT >80%  A decrease of PCT levels of more than 80% defines a negative change in PCT result representing a lower risk for 28-day all-cause mortality of patients diagnosed with severe sepsis or septic shock.       CBC Auto Differential [[id number]]  (Abnormal) Collected: 04/15/23 0850    Specimen: Blood Updated: 04/15/23 0911     WBC 6.43 10*3/mm3      RBC 4.69 10*6/mm3      Hemoglobin 13.1 g/dL      Hematocrit 40.8 %      MCV 87.0 fL      MCH 27.9 pg      MCHC 32.1 g/dL      RDW 12.7 %      RDW-SD 40.4 fl      MPV 10.7 fL      Platelets 166 10*3/mm3      Neutrophil % 77.9 %      Lymphocyte % 9.6 %      Monocyte % 12.1 %      Eosinophil % 0.0 %      Basophil % 0.2 %      Immature Grans % 0.2 %      Neutrophils, Absolute 5.01 10*3/mm3      Lymphocytes, Absolute 0.62 10*3/mm3      Monocytes, Absolute 0.78 10*3/mm3      Eosinophils, Absolute 0.00 10*3/mm3      Basophils, Absolute 0.01 10*3/mm3      Immature Grans, Absolute 0.01 10*3/mm3      nRBC 0.0 /100 WBC     Urinalysis, Microscopic Only - Urine, Clean Catch [[id number]]  (Abnormal) Collected: 04/15/23 0850    Specimen: Urine, Clean Catch Updated: 04/15/23 0925     RBC, UA None Seen /HPF      WBC, UA 0-2 /HPF     "  Comment: Urine culture not indicated.        Bacteria, UA 1+ /HPF      Squamous Epithelial Cells, UA 3-6 /HPF      Hyaline Casts, UA 0-2 /LPF      Mucus, UA Small/1+ /HPF      Methodology Manual Light Microscopy    Blood Culture With NANCY - Blood, Arm, Right [[id number]]  (Normal) Collected: 04/15/23 0855    Specimen: Blood from Arm, Right Updated: 04/15/23 2300     Blood Culture No growth at less than 24 hours    COVID PRE-OP / PRE-PROCEDURE SCREENING ORDER (NO ISOLATION) - Swab, Nasopharynx [[id number]]  (Normal) Collected: 04/15/23 0858    Specimen: Swab from Nasopharynx Updated: 04/15/23 0958    Narrative:      The following orders were created for panel order COVID PRE-OP / PRE-PROCEDURE SCREENING ORDER (NO ISOLATION) - Swab, Nasopharynx.  Procedure                               Abnormality         Status                     ---------                               -----------         ------                     COVID-19, FLU A/B, RSV P...[[id number]]  Normal              Final result                 Please view results for these tests on the individual orders.    COVID-19, FLU A/B, RSV PCR - Swab, Nasopharynx [[id number]]  (Normal) Collected: 04/15/23 0858    Specimen: Swab from Nasopharynx Updated: 04/15/23 0958     COVID19 Not Detected     Influenza A PCR Not Detected     Influenza B PCR Not Detected     RSV, PCR Not Detected    Narrative:      Fact sheet for providers: https://www.fda.gov/media/545690/download    Fact sheet for patients: https://www.fda.gov/media/107653/download    Test performed by PCR.    POC Pregnancy, Urine [[id number]]  (Normal) Collected: 04/15/23 0902    Specimen: Urine Updated: 04/15/23 0903     HCG, Urine, QL Negative     Lot Number [id number]     Internal Positive Control Positive     Internal Negative Control Negative     Expiration Date 4/30/2020    Lactic Acid, Plasma [[id number]]  (Normal) Collected: 04/15/23 1039    Specimen: Blood Updated: 04/15/23 1100     Lactate 0.7 mmol/L      Mycoplasma Pneumoniae PCR - Swab, Larynx [[id number]] Collected: 04/15/23 1646    Specimen: Swab from Larynx Updated: 04/15/23 1701    MRSA Screen, PCR (Inpatient) - Swab, Nares [[id number]]  (Normal) Collected: 04/15/23 1646    Specimen: Swab from Nares Updated: 04/15/23 1824     MRSA PCR No MRSA Detected    Narrative:      The negative predictive value of this diagnostic test is high and should only be used to consider de-escalating anti-MRSA therapy. A positive result may indicate colonization with MRSA and must be correlated clinically.    Legionella Antigen, Urine - Urine, Urine, Clean Catch [[id number]]  (Normal) Collected: 04/15/23 2118    Specimen: Urine, Clean Catch Updated: 04/15/23 2144     LEGIONELLA ANTIGEN, URINE Negative    S. Pneumo Ag Urine or CSF - Urine, Urine, Clean Catch [[id number]]  (Normal) Collected: 04/15/23 2118    Specimen: Urine, Clean Catch Updated: 04/15/23 2144     Strep Pneumo Ag Negative    Urine Culture - Urine, Urine, Clean Catch [[id number]] Collected: 04/15/23 2118    Specimen: Urine, Clean Catch Updated: 04/15/23 2123    CBC & Differential [[id number]]  (Abnormal) Collected: 04/16/23 0342    Specimen: Blood Updated: 04/16/23 0348    Narrative:      The following orders were created for panel order CBC & Differential.  Procedure                               Abnormality         Status                     ---------                               -----------         ------                     CBC Auto Differential[[id number]]        Abnormal            Final result                 Please view results for these tests on the individual orders.    Comprehensive Metabolic Panel [[id number]]  (Abnormal) Collected: 04/16/23 0342    Specimen: Blood Updated: 04/16/23 0441     Glucose 118 mg/dL      BUN 7 mg/dL      Creatinine 0.47 mg/dL      Sodium 142 mmol/L      Potassium 4.1 mmol/L      Chloride 110 mmol/L      CO2 25.0 mmol/L      Calcium 8.6 mg/dL      Total Protein 6.0 g/dL      Albumin 3.4  g/dL      ALT (SGPT) 11 U/L      AST (SGOT) 16 U/L      Alkaline Phosphatase 58 U/L      Total Bilirubin 0.2 mg/dL      Globulin 2.6 gm/dL      A/G Ratio 1.3 g/dL      BUN/Creatinine Ratio 14.9     Anion Gap 7.0 mmol/L      eGFR 137.4 mL/min/1.73     Narrative:      GFR Normal >60  Chronic Kidney Disease <60  Kidney Failure <15      CBC Auto Differential [[id number]]  (Abnormal) Collected: 04/16/23 0342    Specimen: Blood Updated: 04/16/23 0348     WBC 6.69 10*3/mm3      RBC 4.37 10*6/mm3      Hemoglobin 12.2 g/dL      Hematocrit 38.0 %      MCV 87.0 fL      MCH 27.9 pg      MCHC 32.1 g/dL      RDW 13.0 %      RDW-SD 41.7 fl      MPV 10.8 fL      Platelets 167 10*3/mm3      Neutrophil % 79.8 %      Lymphocyte % 10.6 %      Monocyte % 9.1 %      Eosinophil % 0.0 %      Basophil % 0.1 %      Immature Grans % 0.4 %      Neutrophils, Absolute 5.33 10*3/mm3      Lymphocytes, Absolute 0.71 10*3/mm3      Monocytes, Absolute 0.61 10*3/mm3      Eosinophils, Absolute 0.00 10*3/mm3      Basophils, Absolute 0.01 10*3/mm3      Immature Grans, Absolute 0.03 10*3/mm3      nRBC 0.0 /100 WBC           CT Angiogram Chest   Final Result   1. 9 cm consolidation in the lateral portion of the right lower lobe   which is likely pneumonia. Reactive adenopathy in the right hilum and   mediastinum.           This report was finalized on 04/15/2023 11:58 by Dr Mauricio Welch, .      XR Chest 2 View   Final Result   1. Right lower lobe pneumonia.   This report was finalized on 04/15/2023 08:46 by Dr. Clarence Riley MD.          ED Course  ED Course as of 04/16/23 0844   Sat Apr 15, 2023   0953 Dimer is elevated at 1.05.  Patient has right lower lobe pneumonia on her chest x-ray.  She continues to be tachycardic at 115.  I will do a CT a of the chest to rule out pulmonary embolus at this time.  Will order blood cultures and order Rocephin and Zithromax as well. [CW]   1240 CTA of the chest is negative for pulmonary embolism.  Patient does  have consolidated pneumonia.  She has had azithromycin and Rocephin while in the emergency department.  Her heart rate is improved at 100 at this time.  Blood pressure is 98/60.  We will give another liter of lactated Ringer's.  Call is placed to hospitalist at this time for admission.  Reviewed the findings with the patient and her family.  They are in agreement with care plan. [CW]   1250 Spoke with Dr Bustos- hospitalist on call. Has accepted for admission and advised to admit to Dr. Nash [CW]      ED Course User Index  [CW] Mickie Farrar APRN        Medical Decision Making  Patient is a 22-year-old white female presents to the emergency department with cough for the past 6 days.  Patient was seen at [facility] 6 days ago with abdominal pain and had a CAT scan of her abdomen pelvis and advised that she had pneumonia.  She states she had a mild cough then.  She states she was started on Levaquin and albuterol inhaler.  She states she continued to get worse throughout the week.  She was seen at a primary care office yesterday because she was not doing any better.  She received an injection of Rocephin.  No repeat ancillary tests were done.  She was given a cough medication has not helped.  She states she feels very weak and continues to have cough.  She states she is getting worse.  She continues to run fever.  She does have some shortness of breath with exertion.  She states her cough is nonproductive.  She has had intermittent nausea without vomiting.  She has had a decreased appetite has not been eating or drinking over the last few days.  She denies any chest pain.  She states that she has not been swab for COVID or any other viral illness.  She is not for sure what her labs had shown at [facility].  Course of treatment in the er: CT Angiogram Chest   Final Result    1. 9 cm consolidation in the lateral portion of the right lower lobe    which is likely pneumonia.  "Reactive adenopathy in the right hilum and    mediastinum.              This report was finalized on 04/15/2023 11:58 by Dr Mauricio Welch, .     XR Chest 2 View   Final Result    1. Right lower lobe pneumonia.    This report was finalized on 04/15/2023 08:46 by Dr. Clarence Riley MD.     Labs Reviewed  COMPREHENSIVE METABOLIC PANEL - Abnormal; Notable for the following components:     Sodium                        134 (*)                Potassium                     3.3 (*)                Chloride                      95 (*)                 Anion Gap                     17.0 (*)            All other components within normal limits         Narrative: GFR Normal >60                  Chronic Kidney Disease <60                  Kidney Failure <15                    D-DIMER, QUANTITATIVE - Abnormal; Notable for the following components:     D-Dimer, Quantitative         1.05 (*)            All other components within normal limits         Narrative: According to the assay 's published package insert, a normal (<0.50 MCGFEU/mL) D-dimer result in conjunction with a non-high clinical probability assessment, excludes deep vein thrombosis (DVT) and pulmonary embolism (PE) with high sensitivity.                                    D-dimer values increase with age and this can make VTE exclusion of an older population difficult. To address this, the [employer], based on best available evidence and recent guidelines, recommends that clinicians use age-adjusted D-dimer thresholds in patients greater than 50 years of age with: a) a low probability of PE who do not meet all Pulmonary Embolism Rule Out Criteria, or b) in those with intermediate probability of PE.                   The formula for an age-adjusted D-dimer cut-off is \"age/100\".                  For example, a 60 year old patient would have an age-adjusted cut-off of 0.60 MCGFEU/mL and an 80 year old 0.80 MCGFEU/mL.  URINALYSIS W/ " "CULTURE IF INDICATED - Abnormal; Notable for the following components:     Color, UA                     Dark Yellow (*)               Ketones, UA                     (*)                  Blood, UA                       (*)                  Protein, UA                     (*)               All other components within normal limits         Narrative: In absence of clinical symptoms, the presence of pyuria, bacteria, and/or nitrites on the urinalysis result does not correlate with infection.  C-REACTIVE PROTEIN - Abnormal; Notable for the following components:     C-Reactive Protein            15.87 (*)            All other components within normal limits  PROCALCITONIN - Abnormal; Notable for the following components:     Procalcitonin                 0.43 (*)            All other components within normal limits         Narrative: As a Marker for Sepsis (Non-Neonates):                                    1. <0.5 ng/mL represents a low risk of severe sepsis and/or septic shock.                  2. >2 ng/mL represents a high risk of severe sepsis and/or septic shock.                                    As a Marker for Lower Respiratory Tract Infections that require antibiotic therapy:                                    PCT on Admission    Antibiotic Therapy       6-12 Hrs later                                    >0.5                Strongly Recommended                  >0.25 - <0.5        Recommended                   0.1 - 0.25          Discouraged              Remeasure/reassess PCT                  <0.1                Strongly Discouraged     Remeasure/reassess PCT                                    As 28 day mortality risk marker: \"Change in Procalcitonin Result\" (>80% or <=80%) if Day 0 (or Day 1) and Day 4 values are available. Refer to [email or web address]                                    Change in PCT <=80%                  A decrease of PCT levels below or equal to 80% defines a positive change " in PCT test result representing a higher risk for 28-day all-cause mortality of patients diagnosed with severe sepsis for septic shock.                                    Change in PCT >80%                  A decrease of PCT levels of more than 80% defines a negative change in PCT result representing a lower risk for 28-day all-cause mortality of patients diagnosed with severe sepsis or septic shock.                     CBC WITH AUTO DIFFERENTIAL - Abnormal; Notable for the following components:     Neutrophil %                  77.9 (*)               Lymphocyte %                  9.6 (*)                Monocyte %                    12.1 (*)               Eosinophil %                  0.0 (*)                Lymphocytes, Absolute         0.62 (*)            All other components within normal limits  URINALYSIS, MICROSCOPIC ONLY - Abnormal; Notable for the following components:     WBC, UA                       0-2 (*)                Bacteria, UA                  1+ (*)                 Squamous Epithelial Cells, UA   3-6 (*)                Mucus, UA                     Small/1+ (*)            All other components within normal limits  COMPREHENSIVE METABOLIC PANEL - Abnormal; Notable for the following components:     Glucose                       118 (*)                Creatinine                    0.47 (*)               Chloride                      110 (*)                Albumin                       3.4 (*)             All other components within normal limits         Narrative: GFR Normal >60                  Chronic Kidney Disease <60                  Kidney Failure <15                    CBC WITH AUTO DIFFERENTIAL - Abnormal; Notable for the following components:     Neutrophil %                  79.8 (*)               Lymphocyte %                  10.6 (*)               Eosinophil %                  0.0 (*)             All other components within normal limits  BLOOD CULTURE WITH NANCY -  Normal  COVID-19/FLUA&B/RSV, NP SWAB IN TRANSPORT MEDIA 8-12 HR TAT - Normal         Narrative: Fact sheet for providers: https://www.fda.gov/media/638080/download                    Fact sheet for patients: https://www.fda.gov/media/313053/download                                    Test performed by PCR.  BLOOD CULTURE WITH NANCY - Normal  LEGIONELLA ANTIGEN, URINE - Normal  MRSA SCREEN, PCR - Normal         Narrative: The negative predictive value of this diagnostic test is high and should only be used to consider de-escalating anti-MRSA therapy. A positive result may indicate colonization with MRSA and must be correlated clinically.  STREP PNEUMO AG, URINE OR CSF - Normal  LACTIC ACID, PLASMA - Normal  POCT PEFORM URINE PREGNANCY - Normal  COVID PRE-OP / PRE-PROCEDURE SCREENING ORDER (NO ISOLATION)         Narrative: The following orders were created for panel order COVID PRE-OP / PRE-PROCEDURE SCREENING ORDER (NO ISOLATION) - Swab, Nasopharynx.                  Procedure                               Abnormality         Status                                     ---------                               -----------         ------                                     COVID-19, FLU A/B, RSV P...([id number])  Normal              Final result                                                 Please view results for these tests on the individual orders.  RESPIRATORY CULTURE  MYCOPLASMA PNEUMONIAE PCR  RESPIRATORY PANEL PCR W/ COVID-19 (SARS-COV-2) [place]/[facility]/DOROTEO/PAD/[facility] IN-HOUSE, NP SWAB IN UTM/VTP, 3-4 HR TAT  URINE CULTURE  CBC AND DIFFERENTIAL         Narrative: The following orders were created for panel order CBC & Differential.                  Procedure                               Abnormality         Status                                     ---------                               -----------         ------                                     CBC Auto Differential([id number])        Abnormal            Final  result                                                 Please view results for these tests on the individual orders.  CBC AND DIFFERENTIAL  Patient presents emergency department with cough and fever for the past week.  She was seen at [facility] a week ago for abdominal pain and was noted to have pneumonia on her CAT scan of her abdomen pelvis.  She states she was placed on Levaquin.  She been on Levaquin for the past 6 days.  She was seen at her primary care doctor's office yesterday and was given an injection of Rocephin.  She states she is feeling worse and continues to run fever.  She states her abdominal pain has completely resolved.  Chest x-ray reviewed revealed pneumonia.  Her dimer was elevated therefore CTA of the chest was done which was negative for pulmonary embolus however she did have a consolidated pneumonia in the right lower lobe.  She was given Rocephin and Zithromax while in the emergency department.  She was tachycardic initially with a heart rate of 120 and was given 2 L of lactated Ringer's as well.  She has failed outpatient therapy therefore she was admitted to the hospitalist services with diagnosis of pneumonia of the right lung.  Her vital signs upon admission blood pressure was 100/53, temp 98.1, heart rate 86, respirations 16, O2 sats 98% on room air.  She was feeling better after treatment emergency department.  She was also ordered Solu-Medrol and DuoNeb treatment as well.  Patient was admitted to hospitalist services  Differential diagnosis: Pulmonary embolus, pneumonia, bronchitis, COVID    Failure of outpatient treatment: acute illness or injury  Pneumonia of right lung due to infectious organism, unspecified part of lung: acute illness or injury  Amount and/or Complexity of Data Reviewed  Labs: ordered. Decision-making details documented in ED Course.  Radiology: ordered. Decision-making details documented in ED Course.      Risk  Prescription drug  management.  Decision regarding hospitalization.           Final diagnoses:   Pneumonia of right lung due to infectious organism, unspecified part of lung   Failure of outpatient treatment          Mickie Farrar APRN  04/16/23 0844      Electronically signed by Mickie Farrar APRN at 04/16/23 0844       Vital Signs (last 2 days)     Date/Time Temp Temp src Pulse Resp BP Patient Position SpO2    04/17/23 0723 -- -- 97 16 -- -- 99    04/17/23 0719 -- -- 93 20 -- -- 95     SpO2: just  back from bathroom at 04/17/23 0719    04/17/23 0542 100.3 (37.9) Oral -- -- -- -- --    04/17/23 0424 103.1 (39.5) Oral 109 18 100/57 Lying 97    04/17/23 0222 99.6 (37.6) Oral -- -- -- -- --    04/17/23 0002 100.2 (37.9) Oral 107 16 100/53 Lying 97    04/16/23 2121 -- -- 117 18 -- -- 98    04/16/23 2117 99.3 (37.4) Oral 107 16 100/54 Lying 100    04/16/23 2110 -- -- 107 16 -- -- 100    04/16/23 1622 99 (37.2) Oral 112 16 101/51 Lying 94    04/16/23 1515 -- -- 115 16 -- -- 99    04/16/23 1510 -- -- 118 16 -- -- 95    04/16/23 1136 98.2 (36.8) Oral 104 16 115/57 Lying 98    04/16/23 1120 -- -- 86 16 -- -- 100    04/16/23 1115 -- -- 88 16 -- -- 98    04/16/23 0804 98.1 (36.7) Oral 86 16 100/53 Lying 98    04/16/23 0616 -- -- 61 16 -- -- 99    04/16/23 0611 -- -- 69 16 -- -- 98    04/16/23 0515 -- -- 85 -- 99/53 Lying --    04/16/23 0323 97.5 (36.4) Oral 64 16 99/49 Lying 98    04/15/23 2258 97.5 (36.4) Oral 68 16 103/56 Lying 97    04/15/23 2026 97.8 (36.6) Oral 73 16 101/59 Sitting 98    04/15/23 1956 -- -- 78 16 -- -- 99    04/15/23 1951 -- -- 79 16 -- -- 96    04/15/23 1649 98.6 (37) Oral 67 16 94/61 Lying 99    04/15/23 1457 -- -- 74 16 -- -- 97    04/15/23 1452 -- -- 76 16 -- -- 96    04/15/23 1351 98.3 (36.8) Oral 88 16 94/56 Lying 96    04/15/23 1301 -- -- 93 -- 101/65 -- 95    04/15/23 1231 -- -- 100 -- 98/68 -- 95    04/15/23 1046 -- -- 110 -- 91/57 -- 95    04/15/23 1031 -- -- 112 -- 90/63 -- 94    04/15/23 1001  -- -- 115 -- 113/63 -- 95    04/15/23 0931 -- -- 112 -- 101/62 -- 98    04/15/23 0916 -- -- 116 -- 95/58 -- 97    04/15/23 0901 -- -- 118 -- 106/69 -- 98    04/15/23 0847 -- -- 111 -- 106/70 -- 98    04/15/23 0830 -- -- 113 -- 106/69 -- 93    04/15/23 0826 -- -- -- 16 -- -- --    04/15/23 0820 -- -- 112 22 -- -- 97    04/15/23 0746 -- -- 114 -- 109/69 -- --    04/15/23 0738 -- -- -- -- 94/56 -- --    04/15/23 0733 100.1 (37.8) -- 114 20 114/64 -- 96          Current Facility-Administered Medications   Medication Dose Route Frequency Provider Last Rate Last Admin   • acetaminophen (TYLENOL) tablet 650 mg  650 mg Oral Q6H PRN Tate Nash MD   650 mg at 04/17/23 0443   • albuterol (PROVENTIL) nebulizer solution 0.083% 2.5 mg/3mL  1.25 mg Nebulization Q6H PRN Tomasa Gage MD   1.25 mg at 04/16/23 2110   • albuterol (PROVENTIL) nebulizer solution 0.083% 2.5 mg/3mL  1.25 mg Nebulization 4x Daily - RT Tomasa Gage MD   1.25 mg at 04/17/23 0719   • azithromycin (ZITHROMAX) 500 mg in sodium chloride 0.9 % 250 mL IVPB-VTB  500 mg Intravenous Q24H Tate Nash MD   500 mg at 04/17/23 0222   • benzonatate (TESSALON) capsule 200 mg  200 mg Oral TID PRN Tate Nash MD   200 mg at 04/16/23 1246   • cefTRIAXone (ROCEPHIN) 1 g in sodium chloride 0.9 % 100 mL IVPB  1 g Intravenous Q24H Tate Nash  mL/hr at 04/16/23 1442 1 g at 04/16/23 1442   • cetirizine (zyrTEC) tablet 10 mg  10 mg Oral Daily Tomasa Gage MD   10 mg at 04/16/23 1534   • dextromethorphan polistirex ER (DELSYM) 30 MG/5ML oral suspension 60 mg  60 mg Oral Q12H Tate Nash MD   60 mg at 04/16/23 1953   • fluticasone (FLONASE) 50 MCG/ACT nasal spray 2 spray  2 spray Each Nare Daily Tate Nash MD   2 spray at 04/16/23 1535   • lactated ringers infusion  125 mL/hr Intravenous Continuous Tate Nash  mL/hr at 04/17/23 0557 125 mL/hr at 04/17/23 0557   • loperamide (IMODIUM) capsule 2 mg  2 mg Oral 4x Daily PRN Saad  Tate GRUBER MD   2 mg at 04/16/23 2137   • ondansetron (ZOFRAN) injection 4 mg  4 mg Intravenous Q6H PRN Tate Nash MD       • sodium chloride 0.9 % flush 10 mL  10 mL Intravenous Q12H Tate Nash MD   10 mL at 04/16/23 1101   • sodium chloride 0.9 % flush 10 mL  10 mL Intravenous PRN Tate Nash MD       • sodium chloride 0.9 % infusion 40 mL  40 mL Intravenous PRN Tate Nash MD            Physician Progress Notes (last 24 hours)      Tate Nash MD at 04/16/23 1041              [facility]  INPATIENT PROGRESS NOTE    Patient Name: Brittney Stevens  Date of Admission: 4/15/2023  Today's Date: 04/16/23  Length of Stay: 1  Primary Care Physician: Sam Smith MD    Subjective   Chief Complaint: Pneumonia/coughing    HPI   Patient planing coughing, discussed need to start Tessalon Perle.  Hypotension is improving.  Patient has been afebrile last 24 hours.  Patient stated she is a little better.    Review of Systems   Constitutional: Positive for activity change, chills and fatigue. Negative for appetite change and fever.   HENT: Negative for hearing loss, nosebleeds, tinnitus and trouble swallowing.    Eyes: Negative for visual disturbance.   Respiratory: Negative for cough, chest tightness, shortness of breath and wheezing.    Cardiovascular: Negative for chest pain, palpitations and leg swelling.   Gastrointestinal: Negative for abdominal distention, abdominal pain, blood in stool, constipation, diarrhea, nausea and vomiting.   Endocrine: Negative for cold intolerance, heat intolerance, polydipsia, polyphagia and polyuria.   Genitourinary: Negative for decreased urine volume, difficulty urinating, dysuria, flank pain, frequency and hematuria.   Musculoskeletal: Positive for arthralgias. Negative for joint swelling and myalgias.   Skin: Negative for rash.   Allergic/Immunologic: Negative for immunocompromised state.   Neurological: Positive for  weakness. Negative for dizziness, syncope, light-headedness and headaches.   Hematological: Negative for adenopathy. Does not bruise/bleed easily.   Psychiatric/Behavioral: Negative for confusion and sleep disturbance. The patient is not nervous/anxious.       All pertinent negatives and positives are as above. All other systems have been reviewed and are negative unless otherwise stated.     Objective    Temp:  [97.5 °F (36.4 °C)-98.6 °F (37 °C)] 98.1 °F (36.7 °C)  Heart Rate:  [] 86  Resp:  [16] 16  BP: ()/(49-68) 100/53  Physical Exam  Vitals and nursing note reviewed.   HENT:      Head: Normocephalic.   Eyes:      Conjunctiva/sclera: Conjunctivae normal.      Pupils: Pupils are equal, round, and reactive to light.   Neck:      Vascular: No JVD.   Cardiovascular:      Rate and Rhythm: Normal rate and regular rhythm.      Heart sounds: Normal heart sounds.   Pulmonary:      Effort: No respiratory distress.      Breath sounds: No wheezing or rales.      Comments: Crackle right lower lung.  Chest:      Chest wall: No tenderness.   Abdominal:      General: Bowel sounds are normal. There is no distension.      Palpations: Abdomen is soft.      Tenderness: There is no abdominal tenderness.   Musculoskeletal:         General: No tenderness or deformity. Normal range of motion.      Cervical back: Neck supple.   Skin:     General: Skin is warm and dry.      Capillary Refill: Capillary refill takes 2 to 3 seconds.      Findings: No rash.   Neurological:      Mental Status: She is alert and oriented to person, place, and time.      Cranial Nerves: No cranial nerve deficit.      Motor: No abnormal muscle tone.      Deep Tendon Reflexes: Reflexes normal.   Psychiatric:         Mood and Affect: Mood normal.         Behavior: Behavior normal.         Thought Content: Thought content normal.         Judgment: Judgment normal.             Results Review:  I have reviewed the labs, radiology results, and diagnostic  studies.    Laboratory Data:   Results from last 7 days   Lab Units 04/16/23  0342 04/15/23  0850   WBC 10*3/mm3 6.69 6.43   HEMOGLOBIN g/dL 12.2 13.1   HEMATOCRIT % 38.0 40.8   PLATELETS 10*3/mm3 167 166        Results from last 7 days   Lab Units 04/16/23  0342 04/15/23  0850   SODIUM mmol/L 142 134*   POTASSIUM mmol/L 4.1 3.3*   CHLORIDE mmol/L 110* 95*   CO2 mmol/L 25.0 22.0   BUN mg/dL 7 11   CREATININE mg/dL 0.47* 0.80   CALCIUM mg/dL 8.6 8.9   BILIRUBIN mg/dL 0.2 0.5   ALK PHOS U/L 58 71   ALT (SGPT) U/L 11 12   AST (SGOT) U/L 16 20   GLUCOSE mg/dL 118* 88       Culture Data:   Blood Culture   Date Value Ref Range Status   04/15/2023 No growth at less than 24 hours  Preliminary   04/15/2023 No growth at 24 hours  Preliminary       Radiology Data:   Imaging Results (Last 24 Hours)     Procedure Component Value Units Date/Time    CT Angiogram Chest [[id number]] Collected: 04/15/23 1151     Updated: 04/15/23 1201    Narrative:      CT ANGIOGRAM CHEST- 4/15/2023 11:38 AM CDT      HISTORY: dyspnea/ tachycardia/ elevated dimer      COMPARISON: Chest x-ray dated 04/15/2023.      DOSE LENGTH PRODUCT: 112 mGy cm. Automated exposure control was also  utilized to decrease patient radiation dose.     TECHNIQUE: Helical tomographic images of the chest were obtained after  the administration of intravenous contrast following angiogram protocol.  Additionally, 3D and multiplanar reformatted images were provided.        FINDINGS:    Pulmonary arteries: There is adequate enhancement of the pulmonary  arteries to evaluate for central and segmental pulmonary emboli. There  are no filling defects within the main, lobar, segmental or visualized  subsegmental pulmonary arteries. The pulmonary arteries are within  normal limits for size.      Aorta and great vessels: Thoracic aorta is normal in caliber. No  dissection identified. No flow-limiting stenosis identified at the great  vessel origins.     Visualized neck base: The imaged  portion of the base of the neck appears  unremarkable.      Lungs: 9 cm right lower lobe consolidation, laterally, concerning for  pneumonia. Lungs are otherwise clear. The lungs are well expanded. No  pleural effusion. Airways are clear.     Heart: The heart is normal in size. There is no pericardial effusion.     Mediastinum and lymph nodes: Mild reactive adenopathy in the right hilum  and mediastinum..     Skeletal and soft tissues: Chest wall soft tissues are unremarkable. No  acute bony abnormality. Thoracic spine degenerative change.     Upper abdomen: The imaged portion of the upper abdomen demonstrates no  acute process.        Impression:      1. 9 cm consolidation in the lateral portion of the right lower lobe  which is likely pneumonia. Reactive adenopathy in the right hilum and  mediastinum.        This report was finalized on 04/15/2023 11:58 by Dr Mauricio Welch, .          I have reviewed the patient's current medications.     Assessment/Plan   Assessment  Active Hospital Problems    Diagnosis    • **Pneumonia of right lung due to infectious organism, unspecified part of lung        Treatment Plan  Pneumonia/fever.  Patient has mono 2 months ago. Failed outpatient treatment.  Patient went to [place] [facility] 4/9/2023-received Levaquin, Medrol Dosepak, and cough medication and was sent home.  Patient went to see a clinic yesterday at [facility] was given Rocephin and was sent home.  Patient came to ER today with similar symptoms.  Continue Rocephin and azithromycin.  Pneumonia protocol for now.  Tessalon Bele as needed.  Incentive spirometer.  Chest x-ray-Right lower lobe pneumonia.  CTA of the chest-9 cm consolidation in the lateral portion of the right lower lobe which is likely pneumonia, Reactive adenopathy in the right hilum and mediastinum.  Patient is on room air.     Hypotension.  Improving. IV fluids.     Hyponatremia.  Resolved. Lactated ringer.     Hypokalemia.  Resolved.     Nauseous.  Zofran as  needed.     Nutrition.  Regular/house diet.     Blood cultures-no growth less than 24 hours. Legionella antigen-negative.  MRSA screen-negative.  Mycoplasma pneumonia-pending.    Respiratory PCR.  Strep pneumo-negative.  COVID-19-negative.  Flu screen-negative.     Medical Decision Making  Number and Complexity of problems: Pneumonia/fever.  Differential Diagnosis: None.     Conditions and Status      Ongoing treatment.     MDM Data  External documents reviewed: Previous note  Cardiac tracing (EKG, telemetry) interpretation: Sinus .  Radiology interpretation: X-ray/CT scan  Labs reviewed: Laboratory  Any tests that were considered but not ordered: Lab in a.m.     Decision rules/scores evaluated (example [id number], Wells, etc): None.     Discussed with: Patient and father     Care Planning  Shared decision making: Patient and father  Code status and discussions: Full code     Disposition  Social Determinants of Health that impact treatment or disposition: From home  Estimated length of stay is 1 to 3 days.        Electronically signed by Tate Nash MD, 23, 10:41 CDT.    Electronically signed by Tate Nash MD at 23 1102          Consult Notes (last 24 hours)      Tomasa Gage MD at 23 1642      Consult Orders    1. Inpatient Pulmonology Consult [[id number]] ordered by Tate Nash MD at 23 1433                     [facility] [place] [facility]    23, 16:42 CDT  Patient Care Team:  Sam Smith MD as PCP - General (Family Medicine)  Name: Brittney Stevens  : 2000  MRN: [id number]  Contact Serial Number [id number]    Chief complaint: Shortness of breath, right lower lobe pneumonia, history of infectious mononucleosis, possible asthma, allergic rhinitis, anxiety    HPI:  We have been consulted by Tate Nash MD to see this 22 y.o. female.  Patient is a pleasant young  female who was seen in the pulmonary consult  in medical surgical floor.    Her father was present in the room at the time of my visit.  Patient is a young pleasant  female who is a student of [employer].  She is originally from [place] but currently lives in [place] and does not have any primary care provider because she does not have insurance here.  She reported having infectious mononucleosis last year and needed hospitalizations for 2 weeks.  She is non-smoker and apparently did not have any history of any respiratory illness although she had some shortness of breath chest congestion cough and sinus problems for many years.  She had no family history of asthma.  Her mother  in  from pancreatic cancer.  Her father is also a non-smoker and did not have any history of asthma.    Patient presented to the [facility] in [place] on 2023 with upper respiratory tract infection chest congestion cough and feeling tired and exhausted.  She was evaluated in the ER and was discharged home on Levaquin and Medrol Dosepak.  Although she took Levaquin she did not  Medrol Dosepak.  Her symptoms did not improve and she went to a urgent care clinic and was given a dose of Rocephin intramuscular and also had cough medications and was later discharged home.  She presented to [facility] and was admitted on the hospitalist team yesterday.  Further work-up was done.  Her chest x-ray shows hyperinflated lung fields and right lower lobe pneumonia and a CT scan of the chest showed consolidation in the right lower lobe suggesting bacterial pneumonia .  Her COVID screen is negative.  Viral panel has been ordered but has not been done yet.  Her Legionella antigen pneumococcal antigen and mycoplasma and MRSA has been negative.    Further laboratory work-up showed she had normal white cell count elevated C-reactive protein mild elevation of D-dimer and elevated procalcitonin.  Lactate was normal otherwise she had unremarkable lab  work.  In further reviewing the history it appears patient has a history of pharyngitis and pharyngeal abscess which was seen and treated by Dr. Rajesh Salinas last year.  Patient had urinalysis positive for blood but negative for UTI.  She is currently getting IV fluids for possible dehydration.  She feels anxious and is tearful at times and told me she feels she is not going to survive.  Patient is currently getting ceftriaxone and azithromycin.  She is afebrile but had some low-grade fever earlier this morning.  She is oxygenating well and still in the room air    Past Medical History:   has no past medical history on file.   has a past surgical history that includes Peritonsillar Abscess Incision and Drainage (Bilateral, 07/02/2022); Tonsillectomy; and Dental surgery.  No Known Allergies  Medications:  albuterol, 1.25 mg, Nebulization, 4x Daily - RT   And  ipratropium, 0.5 mg, Nebulization, 4x Daily - RT  azithromycin, 500 mg, Intravenous, Q24H  cefTRIAXone, 1 g, Intravenous, Q24H  cetirizine, 10 mg, Oral, Daily  dextromethorphan polistirex ER, 60 mg, Oral, Q12H  fluticasone, 2 spray, Each Nare, Daily  sodium chloride, 10 mL, Intravenous, Q12H      lactated ringers, 125 mL/hr, Last Rate: 125 mL/hr (04/16/23 1104)      Family History:  Family History   Problem Relation Age of Onset   • Lung cancer Maternal Grandfather    • Breast cancer Neg Hx    • Ovarian cancer Neg Hx    • Colon cancer Neg Hx      Social History:   reports that she has never smoked. She has never been exposed to tobacco smoke. She has never used smokeless tobacco. She reports that she does not drink alcohol and does not use drugs.  Review of Systems:  Review of Systems   Constitutional: Positive for chills, diaphoresis, fatigue and fever.   HENT: Positive for congestion, postnasal drip, sinus pressure and sinus pain.    Eyes: Positive for discharge and redness.   Respiratory: Positive for cough, chest tightness and shortness of breath.     Cardiovascular: Negative for chest pain and leg swelling.   Gastrointestinal: Negative.    Endocrine: Negative.    Genitourinary: Negative.    Musculoskeletal: Negative.    Skin: Negative.    Allergic/Immunologic: Positive for environmental allergies.   Neurological: Negative.    Hematological: Negative.    Psychiatric/Behavioral: The patient is nervous/anxious.       Physical Exam:  Temp:  [97.5 °F (36.4 °C)-99 °F (37.2 °C)] 99 °F (37.2 °C)  Heart Rate:  [] 112  Resp:  [16] 16  BP: ()/(49-61) 101/51No intake or output data in the 24 hours ending 04/16/23 1642      04/15/23  0733 04/15/23  1600   Weight: 54.4 kg (120 lb) 58.6 kg (129 lb 1.6 oz)     SpO2 Percentage    04/16/23 1510 04/16/23 1515 04/16/23 1622   SpO2: 95% 99% 94%     Body mass index is 22.87 kg/m².   Physical Exam  Vitals and nursing note reviewed.   Constitutional:       General: She is not in acute distress.     Appearance: Normal appearance. She is ill-appearing. She is not diaphoretic.      Comments: Young  female in no acute distress but looks anxious.  She is also flushed and warm   HENT:      Head: Normocephalic and atraumatic.      Right Ear: Tympanic membrane normal. There is no impacted cerumen.      Left Ear: Tympanic membrane normal. There is no impacted cerumen.      Nose: Congestion present. No rhinorrhea.      Mouth/Throat:      Pharynx: No oropharyngeal exudate or posterior oropharyngeal erythema.   Eyes:      General: No scleral icterus.        Right eye: No discharge.         Left eye: No discharge.      Extraocular Movements: Extraocular movements intact.      Pupils: Pupils are equal, round, and reactive to light.   Neck:      Vascular: No carotid bruit.   Cardiovascular:      Rate and Rhythm: Regular rhythm. Tachycardia present.      Pulses: Normal pulses.      Heart sounds: Normal heart sounds. No murmur heard.    No friction rub. No gallop.   Pulmonary:      Effort: Pulmonary effort is normal. No  respiratory distress.      Breath sounds: No stridor. Wheezing and rales present.   Chest:      Chest wall: No tenderness.   Abdominal:      General: Abdomen is flat. Bowel sounds are normal. There is no distension.      Palpations: Abdomen is soft. There is no mass.      Tenderness: There is no abdominal tenderness. There is no guarding or rebound.   Genitourinary:     Comments: Not examined  Musculoskeletal:         General: No swelling, tenderness or deformity. Normal range of motion.      Cervical back: Normal range of motion and neck supple. No rigidity or tenderness.      Right lower leg: No edema.      Left lower leg: No edema.   Skin:     General: Skin is warm and dry.      Capillary Refill: Capillary refill takes less than 2 seconds.      Coloration: Skin is not jaundiced or pale.      Findings: No bruising, erythema, lesion or rash.      Comments: She appears little flushed   Neurological:      General: No focal deficit present.      Mental Status: She is alert and oriented to person, place, and time. Mental status is at baseline.      Cranial Nerves: No cranial nerve deficit.      Sensory: No sensory deficit.      Motor: No weakness.      Deep Tendon Reflexes: Reflexes normal.   Psychiatric:         Behavior: Behavior normal.         Thought Content: Thought content normal.      Comments: Appears anxious and tearful at times.       Result Review  Results from last 7 days   Lab Units 04/16/23  0342 04/15/23  0850   WBC 10*3/mm3 6.69 6.43   HEMOGLOBIN g/dL 12.2 13.1   PLATELETS 10*3/mm3 167 166     Results from last 7 days   Lab Units 04/16/23  0342 04/15/23  0850   SODIUM mmol/L 142 134*   POTASSIUM mmol/L 4.1 3.3*   CO2 mmol/L 25.0 22.0   BUN mg/dL 7 11   CREATININE mg/dL 0.47* 0.80   GLUCOSE mg/dL 118* 88         Microbiology Results (last 10 days)     Procedure Component Value - Date/Time    Legionella Antigen, Urine - Urine, Urine, Clean Catch [[id number]]  (Normal) Collected: 04/15/23 2118    Lab  Status: Final result Specimen: Urine, Clean Catch Updated: 04/15/23 2144     LEGIONELLA ANTIGEN, URINE Negative    S. Pneumo Ag Urine or CSF - Urine, Urine, Clean Catch [[id number]]  (Normal) Collected: 04/15/23 2118    Lab Status: Final result Specimen: Urine, Clean Catch Updated: 04/15/23 2144     Strep Pneumo Ag Negative    Urine Culture - Urine, Urine, Clean Catch [[id number]]  (Normal) Collected: 04/15/23 2118    Lab Status: Preliminary result Specimen: Urine, Clean Catch Updated: 04/16/23 1154     Urine Culture No growth    MRSA Screen, PCR (Inpatient) - Swab, Nares [[id number]]  (Normal) Collected: 04/15/23 1646    Lab Status: Final result Specimen: Swab from Nares Updated: 04/15/23 1824     MRSA PCR No MRSA Detected    Narrative:      The negative predictive value of this diagnostic test is high and should only be used to consider de-escalating anti-MRSA therapy. A positive result may indicate colonization with MRSA and must be correlated clinically.    COVID PRE-OP / PRE-PROCEDURE SCREENING ORDER (NO ISOLATION) - Swab, Nasopharynx [[id number]]  (Normal) Collected: 04/15/23 0858    Lab Status: Final result Specimen: Swab from Nasopharynx Updated: 04/15/23 0958    Narrative:      The following orders were created for panel order COVID PRE-OP / PRE-PROCEDURE SCREENING ORDER (NO ISOLATION) - Swab, Nasopharynx.  Procedure                               Abnormality         Status                     ---------                               -----------         ------                     COVID-19, FLU A/B, RSV P...[[id number]]  Normal              Final result                 Please view results for these tests on the individual orders.    COVID-19, FLU A/B, RSV PCR - Swab, Nasopharynx [[id number]]  (Normal) Collected: 04/15/23 0858    Lab Status: Final result Specimen: Swab from Nasopharynx Updated: 04/15/23 0958     COVID19 Not Detected     Influenza A PCR Not Detected     Influenza B PCR Not Detected     RSV, PCR Not  Detected    Narrative:      Fact sheet for providers: https://www.fda.gov/media/184350/download    Fact sheet for patients: https://www.fda.gov/media/711174/download    Test performed by PCR.    Blood Culture With NANCY - Blood, Arm, Right [[id number]]  (Normal) Collected: 04/15/23 0855    Lab Status: Preliminary result Specimen: Blood from Arm, Right Updated: 04/16/23 1100     Blood Culture No growth at 24 hours    Blood Culture With NANCY - Blood, Arm, Right [[id number]]  (Normal) Collected: 04/15/23 0850    Lab Status: Preliminary result Specimen: Blood from Arm, Right Updated: 04/16/23 0930     Blood Culture No growth at 24 hours        Recent radiology:   Imaging Results (Last 72 Hours)     Procedure Component Value Units Date/Time    XR Chest PA & Lateral [[id number]] Collected: 04/16/23 1535     Updated: 04/16/23 1539    Narrative:      EXAMINATION: Chest 2 views 04/16/2023     HISTORY: Pneumonia     FINDINGS: Today's exam is compared to previous study one day earlier.  There is persistent right lower lobe pneumonia showing slight worsening  when compared to the previous exam. Lungs are otherwise clear. No  effusion or free air present.       Impression:      1.. Worsening right lower lobe pneumonia.  This report was finalized on 04/16/2023 15:36 by Dr. Clarence Riley MD.    CT Angiogram Chest [[id number]] Collected: 04/15/23 1151     Updated: 04/15/23 1201    Narrative:      CT ANGIOGRAM CHEST- 4/15/2023 11:38 AM CDT      HISTORY: dyspnea/ tachycardia/ elevated dimer      COMPARISON: Chest x-ray dated 04/15/2023.      DOSE LENGTH PRODUCT: 112 mGy cm. Automated exposure control was also  utilized to decrease patient radiation dose.     TECHNIQUE: Helical tomographic images of the chest were obtained after  the administration of intravenous contrast following angiogram protocol.  Additionally, 3D and multiplanar reformatted images were provided.        FINDINGS:    Pulmonary arteries: There is adequate enhancement  of the pulmonary  arteries to evaluate for central and segmental pulmonary emboli. There  are no filling defects within the main, lobar, segmental or visualized  subsegmental pulmonary arteries. The pulmonary arteries are within  normal limits for size.      Aorta and great vessels: Thoracic aorta is normal in caliber. No  dissection identified. No flow-limiting stenosis identified at the great  vessel origins.     Visualized neck base: The imaged portion of the base of the neck appears  unremarkable.      Lungs: 9 cm right lower lobe consolidation, laterally, concerning for  pneumonia. Lungs are otherwise clear. The lungs are well expanded. No  pleural effusion. Airways are clear.     Heart: The heart is normal in size. There is no pericardial effusion.     Mediastinum and lymph nodes: Mild reactive adenopathy in the right hilum  and mediastinum..     Skeletal and soft tissues: Chest wall soft tissues are unremarkable. No  acute bony abnormality. Thoracic spine degenerative change.     Upper abdomen: The imaged portion of the upper abdomen demonstrates no  acute process.        Impression:      1. 9 cm consolidation in the lateral portion of the right lower lobe  which is likely pneumonia. Reactive adenopathy in the right hilum and  mediastinum.        This report was finalized on 04/15/2023 11:58 by Dr Mauricio Welch, .    XR Chest 2 View [[id number]] Collected: 04/15/23 0846     Updated: 04/15/23 0849    Narrative:      EXAMINATION: Chest 2 views 04/15/2023     HISTORY: Cough.     FINDINGS: Two-view exam of the chest demonstrates a right lower lobe  pneumonia with partial silhouetting of the right diaphragm. Lungs are  otherwise clear. No effusion or free air is present.       Impression:      1. Right lower lobe pneumonia.  This report was finalized on 04/15/2023 08:46 by Dr. Clarence Riley MD.        Personal review of imaging: CXR shows Right lower lobe pneumonia hyperinflated lung fields and CT scan shows  Right lower lobe pneumonia.  No lymphadenopathy  Other test results (not lab or imaging):  Elevated CRP, procalcitonin, D-dimer CBC and BMP normal except mild hyperglycemia  Independent review of ekg: Heart rate increased sinus tachycardia  Problem List as identified by Epic (may contain historical, inactive problems)    Pneumonia of right lung due to infectious organism, unspecified part of lung    Pulmonary Assessment:    1. Right lower lobe pneumonia  2. Shortness of breath and cough  3. History of infectious mononucleosis  4. History of peritonsillar abscess  5. Mild intermittent asthma  6. Allergic rhinitis  7. Anxiety  8. Sinus tachycardia    Recommend/plan:   · Patient started on Rocephin and Zithromax and which could be continued  · White cell count normal procalcitonin elevated.  Legionella pneumococcal screening negative  · MRSA and mycoplasma negative.  Respiratory viral panel pending  · Patient is getting IV fluid for dehydration.  I will stop albuterol due to tachycardia  · She can continue with Atrovent and I will start albuterol and lower dose or Xopenex nebulizer  · Continue Atrovent.  Encourage incentive spirometry and flutter valve  · From clinical presentation and imaging studies patient has asthma with hyperinflated lung fields with allergy symptom  · I will start the patient on fluticasone nasal spray and Zyrtec.  Plan for outpatient PFT when she is more stable  · Patient may need further work-up for infectious etiology.  She already had infectious mononucleosis  · We May use oxygen if needed.  Dr. Nash may consider a infectious disease consult  · Monitor labs and imaging studies from time to time.  Physical activity to be encouraged  · Patient is young and active and may not need Lovenox for DVT prophylaxis.  · Pain and anxiety control.  Repeat labs and monitor procalcitonin  · CODE STATUS: Full.  Overall prognosis stable  · We appreciate the consult and we will follow.  · Total time spent in  seeing this patient as pulmonary consult was 45 minutes    Thank you for this consult.  We will follow along.    Electronically signed by     Tomasa Gage MD,   Pulmonologist/Intensivist   04/16/23, 4:42 PM CDT.    Interval progress note    Patient respiratory viral panel has been checked and it is positive for metapneumovirus and adenovirus  She is complaining of difficulty breathing of the respiratory secretions.  She was started on MetaNeb and was placed on oxygen.  Her oxygen saturation was in the low 90s.  She is feeling little better after starting oxygen.  Respiratory isolation initiated.  Patient and her father was notified about the positive viral panel.  We will plan to continue current antibiotic coverage and supportive respiratory care and continue following her.  Further work-up for asthma will be done when she is more stable.    Tomasa Gage MD  Pulmonologist/Intensivist  4/16/2023 18:44 CDT    Electronically signed by Tomasa Gage MD at 04/16/23 5134         Summary: update              Patient has tested positive for Adenovirus, Isolation has been initiated for patient , patient and father have been educated about spread and how to not infect others. 2 ltrs of O2 prescribed by Pulm with humidification. VS have been stable.

## 2023-04-17 NOTE — PLAN OF CARE
Goal Outcome Evaluation:  Plan of Care Reviewed With: patient        Progress: declining  Outcome Evaluation: Pt with fever early this am of 103.1, blankets removed and room cooled. Tylenol given for c/o HA. Fever noted to decrease with interventions. C/o HA x2 with relief noted after Tylenol given. HR sinus tach on tele with HR reaching 140's with activity, and quick recovery noted with rest. Pt noted to have SOA with exertion. IVF/abx as ordered. O2 sats stable on 2L/NC. Contact/droplet isolation in place. Pt and family educated. Sputum sample sent to lab, continue to need stool sample.

## 2023-04-18 PROCEDURE — 99232 SBSQ HOSP IP/OBS MODERATE 35: CPT | Performed by: INTERNAL MEDICINE

## 2023-04-18 PROCEDURE — 25010000002 PIPERACILLIN SOD-TAZOBACTAM PER 1 G: Performed by: FAMILY MEDICINE

## 2023-04-18 PROCEDURE — 94799 UNLISTED PULMONARY SVC/PX: CPT

## 2023-04-18 RX ADMIN — TAZOBACTAM SODIUM AND PIPERACILLIN SODIUM 4.5 G: 500; 4 INJECTION, SOLUTION INTRAVENOUS at 17:12

## 2023-04-18 RX ADMIN — CETIRIZINE HYDROCHLORIDE 10 MG: 10 TABLET ORAL at 09:21

## 2023-04-18 RX ADMIN — TAZOBACTAM SODIUM AND PIPERACILLIN SODIUM 4.5 G: 500; 4 INJECTION, SOLUTION INTRAVENOUS at 09:21

## 2023-04-18 RX ADMIN — FLUTICASONE PROPIONATE 2 SPRAY: 50 SPRAY, METERED NASAL at 09:21

## 2023-04-18 RX ADMIN — DEXTROMETHORPHAN POLISTIREX 60 MG: 30 SUSPENSION ORAL at 21:51

## 2023-04-18 RX ADMIN — DEXTROMETHORPHAN POLISTIREX 60 MG: 30 SUSPENSION ORAL at 09:21

## 2023-04-18 RX ADMIN — TAZOBACTAM SODIUM AND PIPERACILLIN SODIUM 4.5 G: 500; 4 INJECTION, SOLUTION INTRAVENOUS at 02:26

## 2023-04-18 RX ADMIN — SODIUM CHLORIDE 500 ML: 9 INJECTION, SOLUTION INTRAVENOUS at 17:57

## 2023-04-18 NOTE — PLAN OF CARE
Problem: Adult Inpatient Plan of Care  Goal: Plan of Care Review  Outcome: Ongoing, Progressing  Flowsheets (Taken 4/18/2023 0502)  Progress: no change  Plan of Care Reviewed With: patient  Outcome Evaluation: 2 L O2 per nasal cannula PRN. IV abx. Cont. pulse ox. Tele. Safety maintained.

## 2023-04-18 NOTE — PROGRESS NOTES
[facility] Medicine Services  INPATIENT PROGRESS NOTE    Patient Name: Brittney Stevens  Date of Admission: 4/15/2023  Today's Date: 04/18/23  Length of Stay: 3  Primary Care Physician: Sam Smith MD    Subjective   Chief Complaint: follow up pneumonia  Cough  Pertinent negatives include no chest pain, ear pain, rash or wheezing.      Doing ok.  Afebrile  Feels a lot better this afternoon  No SOA  Tolerating room air      Review of Systems   Constitutional: Positive for fatigue.   HENT: Negative for congestion and ear pain.    Eyes: Negative for pain and visual disturbance.   Respiratory: Positive for cough. Negative for wheezing.    Cardiovascular: Negative for chest pain and palpitations.   Gastrointestinal: Negative for diarrhea, nausea and vomiting.   Endocrine: Negative for heat intolerance.   Genitourinary: Negative for dysuria and frequency.   Musculoskeletal: Negative for arthralgias and back pain.   Skin: Negative for rash and wound.   Neurological: Negative for dizziness and light-headedness.   Psychiatric/Behavioral: Negative for confusion. The patient is not nervous/anxious.    All other systems reviewed and are negative.         All pertinent negatives and positives are as above. All other systems have been reviewed and are negative unless otherwise stated.     Objective    Temp:  [98.1 °F (36.7 °C)-99.1 °F (37.3 °C)] 98.1 °F (36.7 °C)  Heart Rate:  [72-85] 82  Resp:  [16-18] 16  BP: (82-96)/(42-60) 82/42  Physical Exam  Constitutional:       Appearance: Normal appearance. She is well-developed.   HENT:      Head: Normocephalic and atraumatic.      Right Ear: Tympanic membrane, ear canal and external ear normal.      Left Ear: Tympanic membrane, ear canal and external ear normal.      Nose: Nose normal.      Mouth/Throat:      Mouth: Mucous membranes are moist.      Pharynx: Oropharynx is clear.   Eyes:      Extraocular Movements: Extraocular movements intact.       Conjunctiva/sclera: Conjunctivae normal.      Pupils: Pupils are equal, round, and reactive to light.   Cardiovascular:      Rate and Rhythm: Normal rate and regular rhythm.      Heart sounds: Normal heart sounds.   Pulmonary:      Effort: Pulmonary effort is normal.      Breath sounds: Decreased breath sounds present.   Abdominal:      General: Bowel sounds are normal. There is no distension.      Palpations: Abdomen is soft.      Tenderness: There is no abdominal tenderness.   Musculoskeletal:         General: Normal range of motion.      Cervical back: Normal range of motion and neck supple.   Skin:     General: Skin is warm and dry.   Neurological:      Mental Status: She is alert and oriented to person, place, and time.   Psychiatric:         Mood and Affect: Mood normal.         Speech: Speech normal.         Behavior: Behavior normal.         Thought Content: Thought content normal.         Judgment: Judgment normal.           Results Review:  I have reviewed the labs, radiology results, and diagnostic studies.    Laboratory Data:   Results from last 7 days   Lab Units 04/17/23  0407 04/16/23  0342 04/15/23  0850   WBC 10*3/mm3 7.25 6.69 6.43   HEMOGLOBIN g/dL 11.8* 12.2 13.1   HEMATOCRIT % 37.3 38.0 40.8   PLATELETS 10*3/mm3 194 167 166        Results from last 7 days   Lab Units 04/17/23  0407 04/16/23  0342 04/15/23  0850   SODIUM mmol/L 139 142 134*   POTASSIUM mmol/L 3.6 4.1 3.3*   CHLORIDE mmol/L 102 110* 95*   CO2 mmol/L 27.0 25.0 22.0   BUN mg/dL 3* 7 11   CREATININE mg/dL 0.63 0.47* 0.80   CALCIUM mg/dL 8.4* 8.6 8.9   BILIRUBIN mg/dL 0.3 0.2 0.5   ALK PHOS U/L 54 58 71   ALT (SGPT) U/L 10 11 12   AST (SGOT) U/L 16 16 20   GLUCOSE mg/dL 98 118* 88       Culture Data:   Blood Culture   Date Value Ref Range Status   04/15/2023 No growth at 2 days  Preliminary   04/15/2023 No growth at 2 days  Preliminary     Urine Culture   Date Value Ref Range Status   04/15/2023 No growth  Final       Radiology  Data:   Imaging Results (Last 24 Hours)     ** No results found for the last 24 hours. **          I have reviewed the patient's current medications.     Assessment/Plan   Assessment  Active Hospital Problems    Diagnosis    • **Pneumonia of right lung due to infectious organism, unspecified part of lung        Treatment Plan  1.  Pneumonia  -IV abx  -pulm following    2.  Adenovirus  -supportive care    3.  Human meta pneumovirus  -supportive care      Medical Decision Making  Number and Complexity of problems: 3 problems, moderate complexity    Risk:  Moderate:  Prescription drug management.    Differential Diagnosis: Bacterial Pneumonia, Viral pneumonia    Conditions and Status        Condition is improving.     MDM Data  1:  Tests/Documents/Independent Historians: (0)  A:  Prior external notes from unique source reviewed: n/a  B.  Review of the Results of Each Unique Test: n/a  C.  Assessment requiring an independent Historian:n/a    2.  Independent interpretation of Tests: n/a  A.  Radiology Interpretation: n/a  B:  EKG/Telemetry Interpretation: n/a    3:  Discussion of management or Test interpretation  n/a       Care Planning  Shared decision making: patient agreeable to treatment plan  Code status and discussions: full code    Disposition  Social Determinants of Health that impact treatment or disposition: n/a  I expect the patient to be discharged to home in 1 day    Electronically signed by Sam Garcia MD, 04/18/23, 17:37 CDT.

## 2023-04-19 VITALS
RESPIRATION RATE: 16 BRPM | HEART RATE: 70 BPM | DIASTOLIC BLOOD PRESSURE: 63 MMHG | WEIGHT: 129.1 LBS | OXYGEN SATURATION: 94 % | HEIGHT: 63 IN | BODY MASS INDEX: 22.88 KG/M2 | SYSTOLIC BLOOD PRESSURE: 98 MMHG | TEMPERATURE: 97.9 F

## 2023-04-19 PROBLEM — B34.0 ADENOVIRUS INFECTION: Status: ACTIVE | Noted: 2023-04-19

## 2023-04-19 PROBLEM — B34.8 INFECTION DUE TO HUMAN METAPNEUMOVIRUS (HMPV): Status: ACTIVE | Noted: 2023-04-19

## 2023-04-19 LAB
ANION GAP SERPL CALCULATED.3IONS-SCNC: 10 MMOL/L (ref 5–15)
BACTERIA SPEC RESP CULT: NORMAL
BUN SERPL-MCNC: 6 MG/DL (ref 6–20)
BUN/CREAT SERPL: 9.8 (ref 7–25)
CALCIUM SPEC-SCNC: 9 MG/DL (ref 8.6–10.5)
CHLORIDE SERPL-SCNC: 103 MMOL/L (ref 98–107)
CO2 SERPL-SCNC: 27 MMOL/L (ref 22–29)
CREAT SERPL-MCNC: 0.61 MG/DL (ref 0.57–1)
DEPRECATED RDW RBC AUTO: 41.7 FL (ref 37–54)
EGFRCR SERPLBLD CKD-EPI 2021: 129 ML/MIN/1.73
ERYTHROCYTE [DISTWIDTH] IN BLOOD BY AUTOMATED COUNT: 13.1 % (ref 12.3–15.4)
GLUCOSE SERPL-MCNC: 94 MG/DL (ref 65–99)
GRAM STN SPEC: NORMAL
HCT VFR BLD AUTO: 40.4 % (ref 34–46.6)
HGB BLD-MCNC: 12.6 G/DL (ref 12–15.9)
MCH RBC QN AUTO: 27.3 PG (ref 26.6–33)
MCHC RBC AUTO-ENTMCNC: 31.2 G/DL (ref 31.5–35.7)
MCV RBC AUTO: 87.4 FL (ref 79–97)
PLATELET # BLD AUTO: 224 10*3/MM3 (ref 140–450)
PMV BLD AUTO: 10.6 FL (ref 6–12)
POTASSIUM SERPL-SCNC: 4 MMOL/L (ref 3.5–5.2)
RBC # BLD AUTO: 4.62 10*6/MM3 (ref 3.77–5.28)
SODIUM SERPL-SCNC: 140 MMOL/L (ref 136–145)
WBC NRBC COR # BLD: 4.31 10*3/MM3 (ref 3.4–10.8)

## 2023-04-19 PROCEDURE — 85027 COMPLETE CBC AUTOMATED: CPT | Performed by: FAMILY MEDICINE

## 2023-04-19 PROCEDURE — 94799 UNLISTED PULMONARY SVC/PX: CPT

## 2023-04-19 PROCEDURE — 25010000002 PIPERACILLIN SOD-TAZOBACTAM PER 1 G: Performed by: FAMILY MEDICINE

## 2023-04-19 PROCEDURE — 80048 BASIC METABOLIC PNL TOTAL CA: CPT | Performed by: FAMILY MEDICINE

## 2023-04-19 RX ORDER — LOPERAMIDE HYDROCHLORIDE 2 MG/1
2 CAPSULE ORAL 4 TIMES DAILY PRN
Qty: 40 CAPSULE | Refills: 0 | Status: SHIPPED | OUTPATIENT
Start: 2023-04-19

## 2023-04-19 RX ORDER — AMOXICILLIN AND CLAVULANATE POTASSIUM 875; 125 MG/1; MG/1
1 TABLET, FILM COATED ORAL 2 TIMES DAILY
Qty: 12 TABLET | Refills: 0 | Status: SHIPPED | OUTPATIENT
Start: 2023-04-19

## 2023-04-19 RX ORDER — BENZONATATE 200 MG/1
200 CAPSULE ORAL 3 TIMES DAILY PRN
Qty: 30 CAPSULE | Refills: 0 | Status: SHIPPED | OUTPATIENT
Start: 2023-04-19

## 2023-04-19 RX ADMIN — TAZOBACTAM SODIUM AND PIPERACILLIN SODIUM 4.5 G: 500; 4 INJECTION, SOLUTION INTRAVENOUS at 11:09

## 2023-04-19 RX ADMIN — FLUTICASONE PROPIONATE 2 SPRAY: 50 SPRAY, METERED NASAL at 08:12

## 2023-04-19 RX ADMIN — TAZOBACTAM SODIUM AND PIPERACILLIN SODIUM 4.5 G: 500; 4 INJECTION, SOLUTION INTRAVENOUS at 03:08

## 2023-04-19 RX ADMIN — DEXTROMETHORPHAN POLISTIREX 60 MG: 30 SUSPENSION ORAL at 08:11

## 2023-04-19 RX ADMIN — CETIRIZINE HYDROCHLORIDE 10 MG: 10 TABLET ORAL at 08:11

## 2023-04-19 NOTE — DISCHARGE SUMMARY
[facility] Medicine Services  DISCHARGE SUMMARY       Date of Admission: 4/15/2023  Date of Discharge:  4/19/2023  Primary Care Physician: Sam Smith MD    Presenting Problem/History of Present Illness:  SOA    Final Discharge Diagnoses:  Active Hospital Problems    Diagnosis    • **Pneumonia of right lung due to infectious organism, unspecified part of lung    • Adenovirus infection    • Infection due to human metapneumovirus (hMPV)        Consults:   1.  Pulmonology    Procedures Performed:   n/a    Pertinent Test Results:   n/a      Imaging Results (All)     Procedure Component Value Units Date/Time    XR Chest PA & Lateral [[id number]] Collected: 04/16/23 1535     Updated: 04/16/23 1539    Narrative:      EXAMINATION: Chest 2 views 04/16/2023     HISTORY: Pneumonia     FINDINGS: Today's exam is compared to previous study one day earlier.  There is persistent right lower lobe pneumonia showing slight worsening  when compared to the previous exam. Lungs are otherwise clear. No  effusion or free air present.       Impression:      1.. Worsening right lower lobe pneumonia.  This report was finalized on 04/16/2023 15:36 by Dr. Clarence Riley MD.    CT Angiogram Chest [[id number]] Collected: 04/15/23 1151     Updated: 04/15/23 1201    Narrative:      CT ANGIOGRAM CHEST- 4/15/2023 11:38 AM CDT      HISTORY: dyspnea/ tachycardia/ elevated dimer      COMPARISON: Chest x-ray dated 04/15/2023.      DOSE LENGTH PRODUCT: 112 mGy cm. Automated exposure control was also  utilized to decrease patient radiation dose.     TECHNIQUE: Helical tomographic images of the chest were obtained after  the administration of intravenous contrast following angiogram protocol.  Additionally, 3D and multiplanar reformatted images were provided.        FINDINGS:    Pulmonary arteries: There is adequate enhancement of the pulmonary  arteries to evaluate for central and segmental pulmonary emboli.  There  are no filling defects within the main, lobar, segmental or visualized  subsegmental pulmonary arteries. The pulmonary arteries are within  normal limits for size.      Aorta and great vessels: Thoracic aorta is normal in caliber. No  dissection identified. No flow-limiting stenosis identified at the great  vessel origins.     Visualized neck base: The imaged portion of the base of the neck appears  unremarkable.      Lungs: 9 cm right lower lobe consolidation, laterally, concerning for  pneumonia. Lungs are otherwise clear. The lungs are well expanded. No  pleural effusion. Airways are clear.     Heart: The heart is normal in size. There is no pericardial effusion.     Mediastinum and lymph nodes: Mild reactive adenopathy in the right hilum  and mediastinum..     Skeletal and soft tissues: Chest wall soft tissues are unremarkable. No  acute bony abnormality. Thoracic spine degenerative change.     Upper abdomen: The imaged portion of the upper abdomen demonstrates no  acute process.        Impression:      1. 9 cm consolidation in the lateral portion of the right lower lobe  which is likely pneumonia. Reactive adenopathy in the right hilum and  mediastinum.        This report was finalized on 04/15/2023 11:58 by Dr Mauricio Welch, .    XR Chest 2 View [[id number]] Collected: 04/15/23 0846     Updated: 04/15/23 0849    Narrative:      EXAMINATION: Chest 2 views 04/15/2023     HISTORY: Cough.     FINDINGS: Two-view exam of the chest demonstrates a right lower lobe  pneumonia with partial silhouetting of the right diaphragm. Lungs are  otherwise clear. No effusion or free air is present.       Impression:      1. Right lower lobe pneumonia.  This report was finalized on 04/15/2023 08:46 by Dr. Clarence Riley MD.        LAB RESULTS:      Lab 04/19/23  0348 04/17/23  0407 04/16/23  0342 04/15/23  1039 04/15/23  0850   WBC 4.31 7.25 6.69  --  6.43   HEMOGLOBIN 12.6 11.8* 12.2  --  13.1   HEMATOCRIT 40.4 37.3 38.0   --  40.8   PLATELETS 224 194 167  --  166   NEUTROS ABS  --  5.44 5.33  --  5.01   IMMATURE GRANS (ABS)  --  0.04 0.03  --  0.01   LYMPHS ABS  --  1.23 0.71  --  0.62*   MONOS ABS  --  0.52 0.61  --  0.78   EOS ABS  --  0.00 0.00  --  0.00   MCV 87.4 87.1 87.0  --  87.0   CRP  --   --   --   --  15.87*   PROCALCITONIN  --   --  0.33*  --  0.43*   LACTATE  --   --   --  0.7  --    D DIMER QUANT  --   --   --   --  1.05*         Lab 04/19/23  0348 04/17/23  0407 04/16/23  0342 04/15/23  0850   SODIUM 140 139 142 134*   POTASSIUM 4.0 3.6 4.1 3.3*   CHLORIDE 103 102 110* 95*   CO2 27.0 27.0 25.0 22.0   ANION GAP 10.0 10.0 7.0 17.0*   BUN 6 3* 7 11   CREATININE 0.61 0.63 0.47* 0.80   EGFR 129.0 128.0 137.4 106.3   GLUCOSE 94 98 118* 88   CALCIUM 9.0 8.4* 8.6 8.9         Lab 04/17/23  0407 04/16/23  0342 04/15/23  0850   TOTAL PROTEIN 5.8* 6.0 7.1   ALBUMIN 3.5 3.4* 4.2   GLOBULIN 2.3 2.6 2.9   ALT (SGPT) 10 11 12   AST (SGOT) 16 16 20   BILIRUBIN 0.3 0.2 0.5   ALK PHOS 54 58 71                     Brief Urine Lab Results  (Last result in the past 365 days)      Color   Clarity   Blood   Leuk Est   Nitrite   Protein   CREAT   Urine HCG        04/15/23 0902               Negative           Microbiology Results (last 10 days)     Procedure Component Value - Date/Time    Gastrointestinal Panel, PCR - Stool, Per Rectum [[id number]]  (Normal) Collected: 04/17/23 1241    Lab Status: Final result Specimen: Stool from Per Rectum Updated: 04/17/23 1400     Campylobacter Not Detected     Plesiomonas shigelloides Not Detected     Salmonella Not Detected     Vibrio Not Detected     Vibrio cholerae Not Detected     Yersinia enterocolitica Not Detected     Enteroaggregative E. coli (EAEC) Not Detected     Enteropathogenic E. coli (EPEC) Not Detected     Enterotoxigenic E. coli (ETEC) lt/st Not Detected     Shiga-like toxin-producing E. coli (STEC) stx1/stx2 Not Detected     Shigella/Enteroinvasive E. coli (EIEC) Not Detected      Cryptosporidium Not Detected     Cyclospora cayetanensis Not Detected     Entamoeba histolytica Not Detected     Giardia lamblia Not Detected     Adenovirus F40/41 Not Detected     Astrovirus Not Detected     Norovirus GI/GII Not Detected     Rotavirus A Not Detected     Sapovirus (I, II, IV or V) Not Detected    Narrative:      If Aeromonas, Staphylococcus aureus or Bacillus cereus are suspected, please order IGL251M: Stool Culture, Aeromonas, S aureus, B Cereus.    Respiratory Culture - Sputum, Cough [[id number]] Collected: 04/16/23 1956    Lab Status: Final result Specimen: Sputum from Cough Updated: 04/19/23 0927     Respiratory Culture Light growth (2+) Normal respiratory suresh. No S. aureus or Pseudomonas aeruginosa detected. Final report.     Gram Stain Moderate (3+) WBCs per low power field      Rare (1+) Epithelial cells per low power field      Few (2+) Gram positive cocci      Few (2+) Gram negative bacilli    Respiratory Panel PCR w/COVID-19(SARS-CoV-2) [facility] In-House, NP Swab in UTM/VTM, 3-4 HR TAT - Swab, Nasopharynx [[id number]]  (Abnormal) Collected: 04/16/23 1645    Lab Status: Final result Specimen: Swab from Nasopharynx Updated: 04/16/23 1751     ADENOVIRUS, PCR Detected     Coronavirus 229E Not Detected     Coronavirus HKU1 Not Detected     Coronavirus NL63 Not Detected     Coronavirus OC43 Not Detected     COVID19 Not Detected     Human Metapneumovirus Detected     Human Rhinovirus/Enterovirus Not Detected     Influenza A PCR Not Detected     Influenza B PCR Not Detected     Parainfluenza Virus 1 Not Detected     Parainfluenza Virus 2 Not Detected     Parainfluenza Virus 3 Not Detected     Parainfluenza Virus 4 Not Detected     RSV, PCR Not Detected     Bordetella pertussis pcr Not Detected     Bordetella parapertussis PCR Not Detected     Chlamydophila pneumoniae PCR Not Detected     Mycoplasma pneumo by PCR Not Detected    Narrative:      In the setting of a positive  respiratory panel with a viral infection PLUS a negative procalcitonin without other underlying concern for bacterial infection, consider observing off antibiotics or discontinuation of antibiotics and continue supportive care. If the respiratory panel is positive for atypical bacterial infection (Bordetella pertussis, Chlamydophila pneumoniae, or Mycoplasma pneumoniae), consider antibiotic de-escalation to target atypical bacterial infection.    Legionella Antigen, Urine - Urine, Urine, Clean Catch [[id number]]  (Normal) Collected: 04/15/23 2118    Lab Status: Final result Specimen: Urine, Clean Catch Updated: 04/15/23 2144     LEGIONELLA ANTIGEN, URINE Negative    S. Pneumo Ag Urine or CSF - Urine, Urine, Clean Catch [[id number]]  (Normal) Collected: 04/15/23 2118    Lab Status: Final result Specimen: Urine, Clean Catch Updated: 04/15/23 2144     Strep Pneumo Ag Negative    Urine Culture - Urine, Urine, Clean Catch [[id number]]  (Normal) Collected: 04/15/23 2118    Lab Status: Final result Specimen: Urine, Clean Catch Updated: 04/16/23 2200     Urine Culture No growth    MRSA Screen, PCR (Inpatient) - Swab, Nares [[id number]]  (Normal) Collected: 04/15/23 1646    Lab Status: Final result Specimen: Swab from Nares Updated: 04/15/23 1824     MRSA PCR No MRSA Detected    Narrative:      The negative predictive value of this diagnostic test is high and should only be used to consider de-escalating anti-MRSA therapy. A positive result may indicate colonization with MRSA and must be correlated clinically.    COVID PRE-OP / PRE-PROCEDURE SCREENING ORDER (NO ISOLATION) - Swab, Nasopharynx [[id number]]  (Normal) Collected: 04/15/23 0858    Lab Status: Final result Specimen: Swab from Nasopharynx Updated: 04/15/23 0958    Narrative:      The following orders were created for panel order COVID PRE-OP / PRE-PROCEDURE SCREENING ORDER (NO ISOLATION) - Swab, Nasopharynx.  Procedure                               Abnormality          Status                     ---------                               -----------         ------                     COVID-19, FLU A/B, RSV P...[[id number]]  Normal              Final result                 Please view results for these tests on the individual orders.    COVID-19, FLU A/B, RSV PCR - Swab, Nasopharynx [[id number]]  (Normal) Collected: 04/15/23 0858    Lab Status: Final result Specimen: Swab from Nasopharynx Updated: 04/15/23 0958     COVID19 Not Detected     Influenza A PCR Not Detected     Influenza B PCR Not Detected     RSV, PCR Not Detected    Narrative:      Fact sheet for providers: https://www.fda.gov/media/632672/download    Fact sheet for patients: https://www.fda.gov/media/544490/download    Test performed by PCR.    Blood Culture With NANCY - Blood, Arm, Right [[id number]]  (Normal) Collected: 04/15/23 0855    Lab Status: Preliminary result Specimen: Blood from Arm, Right Updated: 04/19/23 1100     Blood Culture No growth at 4 days    Blood Culture With NANCY - Blood, Arm, Right [[id number]]  (Normal) Collected: 04/15/23 0850    Lab Status: Preliminary result Specimen: Blood from Arm, Right Updated: 04/19/23 0930     Blood Culture No growth at 4 days          Hospital Course:   Mrs. Stevens presented to the ER on 4/19/23 with SOA.  She was found to have pneumonia.  She was admitted to the hospital.    Pulmonology was consulted.  She was treated with IV abx.  Viral panel was obtained.  She was positive for Human Metapneumovirus and Adenovirus.        She has improved.  She is afebrile.  Her SOA has resolved.  She is tolerating room air.  She feels better and would like to go home.    She is comfortable with being discharged and with the discharge plan.  She was given the chance to ask questions and all questions were answered to her satisfaction.          Physical Exam on Discharge:  BP 98/63 (BP Location: Left arm, Patient Position: Lying)   Pulse 70   Temp 97.9 °F (36.6 °C)   Resp 16   Ht 160 cm  "(63\")   Wt 58.6 kg (129 lb 1.6 oz)   LMP 04/15/2023   SpO2 94%   BMI 22.87 kg/m²   Physical Exam  Constitutional:       Appearance: Normal appearance. She is well-developed.   HENT:      Head: Normocephalic and atraumatic.      Right Ear: Tympanic membrane, ear canal and external ear normal.      Left Ear: Tympanic membrane, ear canal and external ear normal.      Nose: Nose normal.      Mouth/Throat:      Mouth: Mucous membranes are moist.      Pharynx: Oropharynx is clear.   Eyes:      Extraocular Movements: Extraocular movements intact.      Conjunctiva/sclera: Conjunctivae normal.      Pupils: Pupils are equal, round, and reactive to light.   Cardiovascular:      Rate and Rhythm: Normal rate and regular rhythm.      Heart sounds: Normal heart sounds.   Pulmonary:      Effort: Pulmonary effort is normal.      Breath sounds: Normal breath sounds.   Abdominal:      General: Bowel sounds are normal. There is no distension.      Palpations: Abdomen is soft.      Tenderness: There is no abdominal tenderness.   Musculoskeletal:         General: Normal range of motion.      Cervical back: Normal range of motion and neck supple.   Skin:     General: Skin is warm and dry.   Neurological:      Mental Status: She is alert and oriented to person, place, and time.   Psychiatric:         Mood and Affect: Mood normal.         Speech: Speech normal.         Behavior: Behavior normal.         Thought Content: Thought content normal.         Judgment: Judgment normal.           Condition on Discharge: stable    Discharge Disposition:  Home or Self Care    Discharge Medications:     Discharge Medications      New Medications      Instructions Start Date   amoxicillin-clavulanate 875-125 MG per tablet  Commonly known as: Augmentin   1 tablet, Oral, 2 Times Daily      benzonatate 200 MG capsule  Commonly known as: TESSALON   200 mg, Oral, 3 Times Daily PRN      loperamide 2 MG capsule  Commonly known as: IMODIUM   2 mg, Oral, 4 " Times Daily PRN         Continue These Medications      Instructions Start Date   albuterol sulfate  (90 Base) MCG/ACT inhaler  Commonly known as: PROVENTIL HFA;VENTOLIN HFA;PROAIR HFA   1-2 puffs, Inhalation, Every 4 Hours PRN, Every 4-6 hours prn         Stop These Medications    doxycycline 100 MG capsule  Commonly known as: VIBRAMYCIN     levoFLOXacin 500 MG tablet  Commonly known as: LEVAQUIN     Taytulla 1-20 MG-MCG(24) capsule  Generic drug: Norethin Ace-Eth Estrad-FE                Discharge Diet: regular    Activity at Discharge: as tolerated    Follow-up Appointments:   Future Appointments   Date Time Provider Department Center   6/6/2023 11:15 AM Tomasa Gage MD [facility]       Test Results Pending at Discharge: n/a    Electronically signed by Sam Garcia MD, 04/19/23, 12:38 CDT.    Time: 32 minutes.

## 2023-04-19 NOTE — PLAN OF CARE
Problem: Adult Inpatient Plan of Care  Goal: Plan of Care Review  Outcome: Ongoing, Progressing  Flowsheets (Taken 4/19/2023 0321)  Progress: improving  Plan of Care Reviewed With: patient  Outcome Evaluation: IV abx. Cont. pulse ox. Tele. Room air. Afebrile. Safety maintained.

## 2023-04-19 NOTE — PROGRESS NOTES
Assessment tool to be used for patients with existing breathing treatments ordered by hospitalist                               Respiratory Therapist Driven Protocol - RT to Assess and Treat Algorithm    Item 0 Points 1 Point 2 Points 3 Points 4 Points Subtotal   Mental Status Alert, orientated, cooperative Lethargic, follows commands Confused, not following commands Obtunded or Somnolent Comatose 0   Respiratory Pattern Regular RR  8-16 breaths/minute Increased RR  18-25 breaths/minute Dyspnea on exertion, irregular RR  26-30/minute Shortness of breath,  RR 31-35/minute Accessory muscle use, severe SOB  RR > 35/minute 0   Breath Sounds Clear Decreased unilaterally Decreased bilaterally Basilar crackles Wheezing and/or rhonchi 0   Cough Strong, spontaneous non-productive Strong productive Weak, non-productive Weak productive or weak with rhonchi Absent or may require suctioning 0   Pulmonary Status Nonsmoker or no previous history > 1 year quit < 1 PPD  < 1 year quit >  or = 1 PPD Diagnosed pulmonary disease (severe or chronic) Severe or chronic pulmonary disease with exacerbation 0   Surgical Status None General surgery (non-abdominal or non-thoracic) Lower abdominal Thoracic or upper abdominal Thoracic with pulmonary disease 0   Chest X-ray Clear Chronic changes Infiltrates, atelectasis or pleural effusion Infiltrates > 1 lobe Diffuse infiltrates and atelectasis and/or effusions 0   Activity level Ambulatory Ambulatory with assistance Non-ambulatory Paraplegic Quadriplegic       0               Total Score 0   Score    Drug Therapy Frequency  20 or >    Q4 Duoneb & Q3 Albuterol PRN 15 - 19     Q6 Duoneb & Q4 Albuterol PRN 10 - 14    QID Duoneb & Q4 Albuterol PRN 5 - 9    TID Duoneb & Q6 Albuterol PRN 0 - 4    Q4 PRN Duoneb or Q4 PRN Albuterol    Incentive Spirometry - Initial RT instruct    Lung Expansion Therapy (PEP) Bronchopulmonary Hygiene (CPT)   Q4 & PRN - Severe atelectasis, poor  oxygenation Q4 - copious secretions, dyspnea, unable to sleep, mucus plugging   QID - High risk for persistent atelectasis, existence of atelectasis QID & Q4 PRN - Moderate secretion production   TID - At risk for developing atelectasis TID - small amounts of secretions with poor cough   BID - prevention of atelectasis BID - unable to breathe deeply and cough spontaneously   *RT Protocol patients will be re-assessed/re-evaluated every 48 hours.    *Patients who are home nebulizer treatments will be protocoled to no less than their home regimen and will remain     on their home regimen with re-evaluations as needed with changes in patient condition.    RT Comments/Recommendations: CONTINUE Q4PRN ALBUTEROL

## 2023-04-19 NOTE — PAYOR COMM NOTE
"Ref:   [id number]    [facility]  [place],  [phone]  Or  Fax  [phone]       Jayy Stevens (22 y.o. Female)     Date of Birth   2000    Social Security Number       Address   [address] [place] [id number]    Home Phone       MRN   [id number]       Holiness   Other    Marital Status   Single                            Admission Date   4/15/23    Admission Type   Emergency    Admitting Provider   Sam Garcia MD    Attending Provider       Department, Room/Bed   [facility] 3C, 364/1       Discharge Date   4/19/2023    Discharge Disposition   Home or Self Care    Discharge Destination                               Attending Provider: (none)   Allergies: No Known Allergies    Isolation: Droplet, Contact   Infection: Adenovirus (Respiratory) (04/16/23), Human Metapneumovirus  (04/16/23)   Code Status: CPR    Ht: 160 cm (63\")   Wt: 58.6 kg (129 lb 1.6 oz)    Admission Cmt: None   Principal Problem: Pneumonia of right lung due to infectious organism, unspecified part of lung [J18.9]                 Active Insurance as of 4/15/2023     Primary Coverage     Payor Plan Insurance Group Employer/Plan Group    [employer] [place] [employer]      Payor Plan Address Payor Plan Phone Number Payor Plan Fax Number Effective Dates    [address] [address] [phone]  6/25/2022 - None Entered    [facility] [id number]       Subscriber Name Subscriber Birth Date Member ID       JAYY STEVENS 2000 [id number]                 Emergency Contacts      (Rel.) Home Phone Work Phone Mobile Phone    leesa stevens (Father) -- -- [phone]               Discharge Summary      Sam Garcia MD at 04/19/23 1225                [facility] Services  DISCHARGE SUMMARY       Date of Admission: 4/15/2023  Date of Discharge:  4/19/2023  Primary Care Physician: Sam Smith MD    Presenting Problem/History of Present " Illness:  SOA    Final Discharge Diagnoses:  Active Hospital Problems    Diagnosis    • **Pneumonia of right lung due to infectious organism, unspecified part of lung    • Adenovirus infection    • Infection due to human metapneumovirus (hMPV)        Consults:   1.  Pulmonology    Procedures Performed:   n/a    Pertinent Test Results:   n/a      Imaging Results (All)     Procedure Component Value Units Date/Time    XR Chest PA & Lateral [[id number]] Collected: 04/16/23 1535     Updated: 04/16/23 1539    Narrative:      EXAMINATION: Chest 2 views 04/16/2023     HISTORY: Pneumonia     FINDINGS: Today's exam is compared to previous study one day earlier.  There is persistent right lower lobe pneumonia showing slight worsening  when compared to the previous exam. Lungs are otherwise clear. No  effusion or free air present.       Impression:      1.. Worsening right lower lobe pneumonia.  This report was finalized on 04/16/2023 15:36 by Dr. Clarence Riley MD.    CT Angiogram Chest [[id number]] Collected: 04/15/23 1151     Updated: 04/15/23 1201    Narrative:      CT ANGIOGRAM CHEST- 4/15/2023 11:38 AM CDT      HISTORY: dyspnea/ tachycardia/ elevated dimer      COMPARISON: Chest x-ray dated 04/15/2023.      DOSE LENGTH PRODUCT: 112 mGy cm. Automated exposure control was also  utilized to decrease patient radiation dose.     TECHNIQUE: Helical tomographic images of the chest were obtained after  the administration of intravenous contrast following angiogram protocol.  Additionally, 3D and multiplanar reformatted images were provided.        FINDINGS:    Pulmonary arteries: There is adequate enhancement of the pulmonary  arteries to evaluate for central and segmental pulmonary emboli. There  are no filling defects within the main, lobar, segmental or visualized  subsegmental pulmonary arteries. The pulmonary arteries are within  normal limits for size.      Aorta and great vessels: Thoracic aorta is normal in caliber.  No  dissection identified. No flow-limiting stenosis identified at the great  vessel origins.     Visualized neck base: The imaged portion of the base of the neck appears  unremarkable.      Lungs: 9 cm right lower lobe consolidation, laterally, concerning for  pneumonia. Lungs are otherwise clear. The lungs are well expanded. No  pleural effusion. Airways are clear.     Heart: The heart is normal in size. There is no pericardial effusion.     Mediastinum and lymph nodes: Mild reactive adenopathy in the right hilum  and mediastinum..     Skeletal and soft tissues: Chest wall soft tissues are unremarkable. No  acute bony abnormality. Thoracic spine degenerative change.     Upper abdomen: The imaged portion of the upper abdomen demonstrates no  acute process.        Impression:      1. 9 cm consolidation in the lateral portion of the right lower lobe  which is likely pneumonia. Reactive adenopathy in the right hilum and  mediastinum.        This report was finalized on 04/15/2023 11:58 by Dr Mauricio Welch, .    XR Chest 2 View [[id number]] Collected: 04/15/23 0846     Updated: 04/15/23 0849    Narrative:      EXAMINATION: Chest 2 views 04/15/2023     HISTORY: Cough.     FINDINGS: Two-view exam of the chest demonstrates a right lower lobe  pneumonia with partial silhouetting of the right diaphragm. Lungs are  otherwise clear. No effusion or free air is present.       Impression:      1. Right lower lobe pneumonia.  This report was finalized on 04/15/2023 08:46 by Dr. Clarence Riley MD.        LAB RESULTS:      Lab 04/19/23  0348 04/17/23  0407 04/16/23  0342 04/15/23  1039 04/15/23  0850   WBC 4.31 7.25 6.69  --  6.43   HEMOGLOBIN 12.6 11.8* 12.2  --  13.1   HEMATOCRIT 40.4 37.3 38.0  --  40.8   PLATELETS 224 194 167  --  166   NEUTROS ABS  --  5.44 5.33  --  5.01   IMMATURE GRANS (ABS)  --  0.04 0.03  --  0.01   LYMPHS ABS  --  1.23 0.71  --  0.62*   MONOS ABS  --  0.52 0.61  --  0.78   EOS ABS  --  0.00 0.00  --   0.00   MCV 87.4 87.1 87.0  --  87.0   CRP  --   --   --   --  15.87*   PROCALCITONIN  --   --  0.33*  --  0.43*   LACTATE  --   --   --  0.7  --    D DIMER QUANT  --   --   --   --  1.05*         Lab 04/19/23  0348 04/17/23  0407 04/16/23  0342 04/15/23  0850   SODIUM 140 139 142 134*   POTASSIUM 4.0 3.6 4.1 3.3*   CHLORIDE 103 102 110* 95*   CO2 27.0 27.0 25.0 22.0   ANION GAP 10.0 10.0 7.0 17.0*   BUN 6 3* 7 11   CREATININE 0.61 0.63 0.47* 0.80   EGFR 129.0 128.0 137.4 106.3   GLUCOSE 94 98 118* 88   CALCIUM 9.0 8.4* 8.6 8.9         Lab 04/17/23  0407 04/16/23  0342 04/15/23  0850   TOTAL PROTEIN 5.8* 6.0 7.1   ALBUMIN 3.5 3.4* 4.2   GLOBULIN 2.3 2.6 2.9   ALT (SGPT) 10 11 12   AST (SGOT) 16 16 20   BILIRUBIN 0.3 0.2 0.5   ALK PHOS 54 58 71                     Brief Urine Lab Results  (Last result in the past 365 days)      Color   Clarity   Blood   Leuk Est   Nitrite   Protein   CREAT   Urine HCG        04/15/23 0902               Negative           Microbiology Results (last 10 days)     Procedure Component Value - Date/Time    Gastrointestinal Panel, PCR - Stool, Per Rectum [[id number]]  (Normal) Collected: 04/17/23 1241    Lab Status: Final result Specimen: Stool from Per Rectum Updated: 04/17/23 1400     Campylobacter Not Detected     Plesiomonas shigelloides Not Detected     Salmonella Not Detected     Vibrio Not Detected     Vibrio cholerae Not Detected     Yersinia enterocolitica Not Detected     Enteroaggregative E. coli (EAEC) Not Detected     Enteropathogenic E. coli (EPEC) Not Detected     Enterotoxigenic E. coli (ETEC) lt/st Not Detected     Shiga-like toxin-producing E. coli (STEC) stx1/stx2 Not Detected     Shigella/Enteroinvasive E. coli (EIEC) Not Detected     Cryptosporidium Not Detected     Cyclospora cayetanensis Not Detected     Entamoeba histolytica Not Detected     Giardia lamblia Not Detected     Adenovirus F40/41 Not Detected     Astrovirus Not Detected     Norovirus GI/GII Not Detected      Rotavirus A Not Detected     Sapovirus (I, II, IV or V) Not Detected    Narrative:      If Aeromonas, Staphylococcus aureus or Bacillus cereus are suspected, please order [id number]: Stool Culture, Aeromonas, S aureus, B Cereus.    Respiratory Culture - Sputum, Cough [[id number]] Collected: 04/16/23 1956    Lab Status: Final result Specimen: Sputum from Cough Updated: 04/19/23 0927     Respiratory Culture Light growth (2+) Normal respiratory suresh. No S. aureus or Pseudomonas aeruginosa detected. Final report.     Gram Stain Moderate (3+) WBCs per low power field      Rare (1+) Epithelial cells per low power field      Few (2+) Gram positive cocci      Few (2+) Gram negative bacilli    Respiratory Panel PCR w/COVID-19(SARS-CoV-2) [facility] In-House, NP Swab in UTM/VTM, 3-4 HR TAT - Swab, Nasopharynx [[id number]]  (Abnormal) Collected: 04/16/23 1645    Lab Status: Final result Specimen: Swab from Nasopharynx Updated: 04/16/23 1751     ADENOVIRUS, PCR Detected     Coronavirus 229E Not Detected     Coronavirus HKU1 Not Detected     Coronavirus NL63 Not Detected     Coronavirus OC43 Not Detected     COVID19 Not Detected     Human Metapneumovirus Detected     Human Rhinovirus/Enterovirus Not Detected     Influenza A PCR Not Detected     Influenza B PCR Not Detected     Parainfluenza Virus 1 Not Detected     Parainfluenza Virus 2 Not Detected     Parainfluenza Virus 3 Not Detected     Parainfluenza Virus 4 Not Detected     RSV, PCR Not Detected     Bordetella pertussis pcr Not Detected     Bordetella parapertussis PCR Not Detected     Chlamydophila pneumoniae PCR Not Detected     Mycoplasma pneumo by PCR Not Detected    Narrative:      In the setting of a positive respiratory panel with a viral infection PLUS a negative procalcitonin without other underlying concern for bacterial infection, consider observing off antibiotics or discontinuation of antibiotics and continue supportive care. If the respiratory  panel is positive for atypical bacterial infection (Bordetella pertussis, Chlamydophila pneumoniae, or Mycoplasma pneumoniae), consider antibiotic de-escalation to target atypical bacterial infection.    Legionella Antigen, Urine - Urine, Urine, Clean Catch [[id number]]  (Normal) Collected: 04/15/23 2118    Lab Status: Final result Specimen: Urine, Clean Catch Updated: 04/15/23 2144     LEGIONELLA ANTIGEN, URINE Negative    S. Pneumo Ag Urine or CSF - Urine, Urine, Clean Catch [[id number]]  (Normal) Collected: 04/15/23 2118    Lab Status: Final result Specimen: Urine, Clean Catch Updated: 04/15/23 2144     Strep Pneumo Ag Negative    Urine Culture - Urine, Urine, Clean Catch [[id number]]  (Normal) Collected: 04/15/23 2118    Lab Status: Final result Specimen: Urine, Clean Catch Updated: 04/16/23 2200     Urine Culture No growth    MRSA Screen, PCR (Inpatient) - Swab, Nares [[id number]]  (Normal) Collected: 04/15/23 1646    Lab Status: Final result Specimen: Swab from Nares Updated: 04/15/23 1824     MRSA PCR No MRSA Detected    Narrative:      The negative predictive value of this diagnostic test is high and should only be used to consider de-escalating anti-MRSA therapy. A positive result may indicate colonization with MRSA and must be correlated clinically.    COVID PRE-OP / PRE-PROCEDURE SCREENING ORDER (NO ISOLATION) - Swab, Nasopharynx [[id number]]  (Normal) Collected: 04/15/23 0858    Lab Status: Final result Specimen: Swab from Nasopharynx Updated: 04/15/23 0958    Narrative:      The following orders were created for panel order COVID PRE-OP / PRE-PROCEDURE SCREENING ORDER (NO ISOLATION) - Swab, Nasopharynx.  Procedure                               Abnormality         Status                     ---------                               -----------         ------                     COVID-19, FLU A/B, RSV P...[[id number]]  Normal              Final result                 Please view results for these tests on the  "individual orders.    COVID-19, FLU A/B, RSV PCR - Swab, Nasopharynx [[id number]]  (Normal) Collected: 04/15/23 0858    Lab Status: Final result Specimen: Swab from Nasopharynx Updated: 04/15/23 0958     COVID19 Not Detected     Influenza A PCR Not Detected     Influenza B PCR Not Detected     RSV, PCR Not Detected    Narrative:      Fact sheet for providers: https://www.fda.gov/media/337420/download    Fact sheet for patients: https://www.fda.gov/media/395233/download    Test performed by PCR.    Blood Culture With NANCY - Blood, Arm, Right [[id number]]  (Normal) Collected: 04/15/23 0855    Lab Status: Preliminary result Specimen: Blood from Arm, Right Updated: 04/19/23 1100     Blood Culture No growth at 4 days    Blood Culture With NANCY - Blood, Arm, Right [[id number]]  (Normal) Collected: 04/15/23 0850    Lab Status: Preliminary result Specimen: Blood from Arm, Right Updated: 04/19/23 0930     Blood Culture No growth at 4 days          Hospital Course:   Mrs. Stevens presented to the ER on 4/19/23 with SOA.  She was found to have pneumonia.  She was admitted to the hospital.    Pulmonology was consulted.  She was treated with IV abx.  Viral panel was obtained.  She was positive for Human Metapneumovirus and Adenovirus.        She has improved.  She is afebrile.  Her SOA has resolved.  She is tolerating room air.  She feels better and would like to go home.    She is comfortable with being discharged and with the discharge plan.  She was given the chance to ask questions and all questions were answered to her satisfaction.          Physical Exam on Discharge:  BP 98/63 (BP Location: Left arm, Patient Position: Lying)   Pulse 70   Temp 97.9 °F (36.6 °C)   Resp 16   Ht 160 cm (63\")   Wt 58.6 kg (129 lb 1.6 oz)   LMP 04/15/2023   SpO2 94%   BMI 22.87 kg/m²   Physical Exam  Constitutional:       Appearance: Normal appearance. She is well-developed.   HENT:      Head: Normocephalic and atraumatic.      Right Ear: " Tympanic membrane, ear canal and external ear normal.      Left Ear: Tympanic membrane, ear canal and external ear normal.      Nose: Nose normal.      Mouth/Throat:      Mouth: Mucous membranes are moist.      Pharynx: Oropharynx is clear.   Eyes:      Extraocular Movements: Extraocular movements intact.      Conjunctiva/sclera: Conjunctivae normal.      Pupils: Pupils are equal, round, and reactive to light.   Cardiovascular:      Rate and Rhythm: Normal rate and regular rhythm.      Heart sounds: Normal heart sounds.   Pulmonary:      Effort: Pulmonary effort is normal.      Breath sounds: Normal breath sounds.   Abdominal:      General: Bowel sounds are normal. There is no distension.      Palpations: Abdomen is soft.      Tenderness: There is no abdominal tenderness.   Musculoskeletal:         General: Normal range of motion.      Cervical back: Normal range of motion and neck supple.   Skin:     General: Skin is warm and dry.   Neurological:      Mental Status: She is alert and oriented to person, place, and time.   Psychiatric:         Mood and Affect: Mood normal.         Speech: Speech normal.         Behavior: Behavior normal.         Thought Content: Thought content normal.         Judgment: Judgment normal.           Condition on Discharge: stable    Discharge Disposition:  Home or Self Care    Discharge Medications:     Discharge Medications      New Medications      Instructions Start Date   amoxicillin-clavulanate 875-125 MG per tablet  Commonly known as: Augmentin   1 tablet, Oral, 2 Times Daily      benzonatate 200 MG capsule  Commonly known as: TESSALON   200 mg, Oral, 3 Times Daily PRN      loperamide 2 MG capsule  Commonly known as: IMODIUM   2 mg, Oral, 4 Times Daily PRN         Continue These Medications      Instructions Start Date   albuterol sulfate  (90 Base) MCG/ACT inhaler  Commonly known as: PROVENTIL HFA;VENTOLIN HFA;PROAIR HFA   1-2 puffs, Inhalation, Every 4 Hours PRN, Every  4-6 hours prn         Stop These Medications    doxycycline 100 MG capsule  Commonly known as: VIBRAMYCIN     levoFLOXacin 500 MG tablet  Commonly known as: LEVAQUIN     Taytulla 1-20 MG-MCG(24) capsule  Generic drug: Norethin Ace-Eth Estrad-FE                Discharge Diet: regular    Activity at Discharge: as tolerated    Follow-up Appointments:   Future Appointments   Date Time Provider Department Center   6/6/2023 11:15 AM Tomasa Gage MD [facility]       Test Results Pending at Discharge: n/a    Electronically signed by Merly Rao MD, 04/19/23, 12:38 CDT.    Time: 32 minutes.           Electronically signed by Merly Rao MD at 04/19/23 1238       Discharge Order (From admission, onward)     Start     Ordered    04/19/23 0845  Discharge patient  Once        Expected Discharge Date: 04/19/23    Discharge Disposition: Home or Self Care    Physician of Record for Attribution - Please select from Treatment Team: MERLY RAO [[id number]]    Review needed by CMO to determine Physician of Record: No       Question Answer Comment   Physician of Record for Attribution - Please select from Treatment Team MERLY RAO    Review needed by CMO to determine Physician of Record No        04/19/23 3956

## 2023-04-20 ENCOUNTER — READMISSION MANAGEMENT (OUTPATIENT)
Dept: CALL CENTER | Facility: HOSPITAL | Age: 23
End: 2023-04-20
Payer: COMMERCIAL

## 2023-04-20 LAB
BACTERIA SPEC AEROBE CULT: NORMAL
BACTERIA SPEC AEROBE CULT: NORMAL

## 2023-04-20 NOTE — OUTREACH NOTE
Prep Survey    Flowsheet Row Responses   [facility] facility patient discharged from? [facility]   Is LACE score < 7 ? No   Eligibility Readm Mgmt   Discharge diagnosis Pneumonia   Does the patient have one of the following disease processes/diagnoses(primary or secondary)? Pneumonia   Does the patient have Home health ordered? No   Is there a DME ordered? No   Prep survey completed? Yes          Jennifer POP - Registered Nurse

## 2023-04-22 LAB — M PNEUMO DNA SPEC QL NAA+PROBE: NEGATIVE

## 2023-04-25 ENCOUNTER — READMISSION MANAGEMENT (OUTPATIENT)
Dept: CALL CENTER | Facility: HOSPITAL | Age: 23
End: 2023-04-25
Payer: COMMERCIAL

## 2023-05-05 ENCOUNTER — READMISSION MANAGEMENT (OUTPATIENT)
Dept: CALL CENTER | Facility: HOSPITAL | Age: 23
End: 2023-05-05
Payer: COMMERCIAL

## 2023-05-05 NOTE — OUTREACH NOTE
COPD/PN Week 3 Survey    Flowsheet Row Responses   [facility] facility patient discharged from? [facility]   Does the patient have one of the following disease processes/diagnoses(primary or secondary)? Pneumonia   Week 3 attempt successful? No   Unsuccessful attempts Attempt 1  [All numbers listed were attempted-no answer]          Dixie H - Registered Nurse

## 2023-05-10 ENCOUNTER — READMISSION MANAGEMENT (OUTPATIENT)
Dept: CALL CENTER | Facility: HOSPITAL | Age: 23
End: 2023-05-10
Payer: COMMERCIAL

## 2023-05-10 NOTE — OUTREACH NOTE
COPD/PN Week 3 Survey    Flowsheet Row Responses   Worship facility patient discharged from? [facility]   Does the patient have one of the following disease processes/diagnoses(primary or secondary)? Pneumonia   Week 3 attempt successful? No   Unsuccessful attempts Attempt 2          SELMA EASTMAN - Registered Nurse

## (undated) DEVICE — SPONGE,TONSIL,DBL STRNG,XRAY,MED,1",STRL: Brand: MEDLINE INDUSTRIES, INC.

## (undated) DEVICE — BAPTIST TURNOVER KIT: Brand: MEDLINE INDUSTRIES, INC.

## (undated) DEVICE — PLASMABLADE PS300-002 TNA TIP RD: Brand: PLASMABLADE™

## (undated) DEVICE — PAD T&A PACK: Brand: MEDLINE INDUSTRIES, INC.

## (undated) DEVICE — PLASMABLADE PS300-004 SUCT COAG BLA 16PK: Brand: PLASMABLADE™

## (undated) DEVICE — GLV SURG BIOGEL M LTX PF 7 1/2

## (undated) DEVICE — SUREFIT, DUAL DISPERSIVE ELECTRODE, CONTACT QUALITY MONITOR: Brand: SUREFIT

## (undated) DEVICE — TOWEL,OR,DSP,ST,BLUE,STD,4/PK,20PK/CS: Brand: MEDLINE

## (undated) DEVICE — TUBING, SUCTION, 1/4" X 12', STRAIGHT: Brand: MEDLINE